# Patient Record
Sex: FEMALE | Race: WHITE | NOT HISPANIC OR LATINO | Employment: OTHER | URBAN - METROPOLITAN AREA
[De-identification: names, ages, dates, MRNs, and addresses within clinical notes are randomized per-mention and may not be internally consistent; named-entity substitution may affect disease eponyms.]

---

## 2020-03-03 ENCOUNTER — HOSPITAL ENCOUNTER (OUTPATIENT)
Dept: CARDIOLOGY | Facility: HOSPITAL | Age: 65
Discharge: HOME | End: 2020-03-03

## 2020-03-03 ENCOUNTER — HOSPITAL ENCOUNTER (INPATIENT)
Facility: HOSPITAL | Age: 65
LOS: 2 days | Discharge: HOME | End: 2020-03-05
Attending: INTERNAL MEDICINE | Admitting: INTERNAL MEDICINE

## 2020-03-03 VITALS
HEART RATE: 93 BPM | SYSTOLIC BLOOD PRESSURE: 188 MMHG | HEIGHT: 63 IN | DIASTOLIC BLOOD PRESSURE: 99 MMHG | WEIGHT: 134 LBS | OXYGEN SATURATION: 97 % | BODY MASS INDEX: 23.74 KG/M2

## 2020-03-03 DIAGNOSIS — R07.9 CHEST PAIN, UNSPECIFIED TYPE: ICD-10-CM

## 2020-03-03 DIAGNOSIS — R06.02 SHORTNESS OF BREATH: ICD-10-CM

## 2020-03-03 DIAGNOSIS — I10 HYPERTENSION, UNSPECIFIED TYPE: Primary | ICD-10-CM

## 2020-03-03 PROBLEM — I16.0 HYPERTENSIVE URGENCY: Status: ACTIVE | Noted: 2020-03-03

## 2020-03-03 LAB
ALBUMIN SERPL-MCNC: 5.1 G/DL (ref 3.5–5.2)
ALBUMIN/GLOB SERPL: 2.4 G/DL
ALP SERPL-CCNC: 67 U/L (ref 39–117)
ALT SERPL W P-5'-P-CCNC: 12 U/L (ref 1–33)
ANION GAP SERPL CALCULATED.3IONS-SCNC: 14.3 MMOL/L (ref 5–15)
AST SERPL-CCNC: 18 U/L (ref 1–32)
BASOPHILS # BLD AUTO: 0.03 10*3/MM3 (ref 0–0.2)
BASOPHILS NFR BLD AUTO: 0.5 % (ref 0–1.5)
BILIRUB SERPL-MCNC: 0.4 MG/DL (ref 0.2–1.2)
BUN BLD-MCNC: 14 MG/DL (ref 8–23)
BUN/CREAT SERPL: 18.7 (ref 7–25)
CALCIUM SPEC-SCNC: 9.7 MG/DL (ref 8.6–10.5)
CHLORIDE SERPL-SCNC: 98 MMOL/L (ref 98–107)
CO2 SERPL-SCNC: 24.7 MMOL/L (ref 22–29)
CREAT BLD-MCNC: 0.75 MG/DL (ref 0.57–1)
D DIMER PPP FEU-MCNC: 0.39 MCGFEU/ML (ref 0–0.49)
DEPRECATED RDW RBC AUTO: 42.4 FL (ref 37–54)
EOSINOPHIL # BLD AUTO: 0.07 10*3/MM3 (ref 0–0.4)
EOSINOPHIL NFR BLD AUTO: 1.3 % (ref 0.3–6.2)
ERYTHROCYTE [DISTWIDTH] IN BLOOD BY AUTOMATED COUNT: 12.6 % (ref 12.3–15.4)
GFR SERPL CREATININE-BSD FRML MDRD: 77 ML/MIN/1.73
GLOBULIN UR ELPH-MCNC: 2.1 GM/DL
GLUCOSE BLD-MCNC: 94 MG/DL (ref 65–99)
HCT VFR BLD AUTO: 40.7 % (ref 34–46.6)
HGB BLD-MCNC: 13.8 G/DL (ref 12–15.9)
IMM GRANULOCYTES # BLD AUTO: 0.01 10*3/MM3 (ref 0–0.05)
IMM GRANULOCYTES NFR BLD AUTO: 0.2 % (ref 0–0.5)
LYMPHOCYTES # BLD AUTO: 1.02 10*3/MM3 (ref 0.7–3.1)
LYMPHOCYTES NFR BLD AUTO: 18.6 % (ref 19.6–45.3)
MCH RBC QN AUTO: 31.1 PG (ref 26.6–33)
MCHC RBC AUTO-ENTMCNC: 33.9 G/DL (ref 31.5–35.7)
MCV RBC AUTO: 91.7 FL (ref 79–97)
MONOCYTES # BLD AUTO: 0.6 10*3/MM3 (ref 0.1–0.9)
MONOCYTES NFR BLD AUTO: 10.9 % (ref 5–12)
NEUTROPHILS # BLD AUTO: 3.75 10*3/MM3 (ref 1.7–7)
NEUTROPHILS NFR BLD AUTO: 68.5 % (ref 42.7–76)
NRBC BLD AUTO-RTO: 0 /100 WBC (ref 0–0.2)
NT-PROBNP SERPL-MCNC: 96.6 PG/ML (ref 5–900)
PLATELET # BLD AUTO: 241 10*3/MM3 (ref 140–450)
PMV BLD AUTO: 10.6 FL (ref 6–12)
POTASSIUM BLD-SCNC: 3.8 MMOL/L (ref 3.5–5.2)
PROT SERPL-MCNC: 7.2 G/DL (ref 6–8.5)
RBC # BLD AUTO: 4.44 10*6/MM3 (ref 3.77–5.28)
SODIUM BLD-SCNC: 137 MMOL/L (ref 136–145)
TROPONIN T SERPL-MCNC: <0.01 NG/ML (ref 0–0.03)
TROPONIN T SERPL-MCNC: <0.01 NG/ML (ref 0–0.03)
TSH SERPL DL<=0.05 MIU/L-ACNC: 3.76 UIU/ML (ref 0.27–4.2)
WBC NRBC COR # BLD: 5.48 10*3/MM3 (ref 3.4–10.8)

## 2020-03-03 PROCEDURE — 84443 ASSAY THYROID STIM HORMONE: CPT | Performed by: INTERNAL MEDICINE

## 2020-03-03 PROCEDURE — 80053 COMPREHEN METABOLIC PANEL: CPT | Performed by: INTERNAL MEDICINE

## 2020-03-03 PROCEDURE — 36415 COLL VENOUS BLD VENIPUNCTURE: CPT

## 2020-03-03 PROCEDURE — 94760 N-INVAS EAR/PLS OXIMETRY 1: CPT

## 2020-03-03 PROCEDURE — 84484 ASSAY OF TROPONIN QUANT: CPT | Performed by: INTERNAL MEDICINE

## 2020-03-03 PROCEDURE — 84244 ASSAY OF RENIN: CPT | Performed by: INTERNAL MEDICINE

## 2020-03-03 PROCEDURE — 25010000002 ONDANSETRON PER 1 MG: Performed by: INTERNAL MEDICINE

## 2020-03-03 PROCEDURE — 85379 FIBRIN DEGRADATION QUANT: CPT | Performed by: INTERNAL MEDICINE

## 2020-03-03 PROCEDURE — 85025 COMPLETE CBC W/AUTO DIFF WBC: CPT | Performed by: INTERNAL MEDICINE

## 2020-03-03 PROCEDURE — G0378 HOSPITAL OBSERVATION PER HR: HCPCS

## 2020-03-03 PROCEDURE — 83880 ASSAY OF NATRIURETIC PEPTIDE: CPT | Performed by: INTERNAL MEDICINE

## 2020-03-03 PROCEDURE — 99222 1ST HOSP IP/OBS MODERATE 55: CPT | Performed by: INTERNAL MEDICINE

## 2020-03-03 PROCEDURE — 82088 ASSAY OF ALDOSTERONE: CPT | Performed by: INTERNAL MEDICINE

## 2020-03-03 RX ORDER — ASPIRIN 81 MG/1
81 TABLET ORAL DAILY
Status: DISCONTINUED | OUTPATIENT
Start: 2020-03-03 | End: 2020-03-05 | Stop reason: HOSPADM

## 2020-03-03 RX ORDER — SODIUM CHLORIDE 0.9 % (FLUSH) 0.9 %
10 SYRINGE (ML) INJECTION AS NEEDED
Status: DISCONTINUED | OUTPATIENT
Start: 2020-03-03 | End: 2020-03-05 | Stop reason: HOSPADM

## 2020-03-03 RX ORDER — HYDROMORPHONE HYDROCHLORIDE 1 MG/ML
0.5 INJECTION, SOLUTION INTRAMUSCULAR; INTRAVENOUS; SUBCUTANEOUS
Status: DISCONTINUED | OUTPATIENT
Start: 2020-03-03 | End: 2020-03-05

## 2020-03-03 RX ORDER — NITROGLYCERIN 0.4 MG/1
0.4 TABLET SUBLINGUAL
Status: DISCONTINUED | OUTPATIENT
Start: 2020-03-03 | End: 2020-03-05 | Stop reason: HOSPADM

## 2020-03-03 RX ORDER — SODIUM CHLORIDE 0.9 % (FLUSH) 0.9 %
10 SYRINGE (ML) INJECTION AS NEEDED
Status: DISCONTINUED | OUTPATIENT
Start: 2020-03-03 | End: 2020-03-05

## 2020-03-03 RX ORDER — SODIUM CHLORIDE 0.9 % (FLUSH) 0.9 %
10 SYRINGE (ML) INJECTION EVERY 12 HOURS SCHEDULED
Status: DISCONTINUED | OUTPATIENT
Start: 2020-03-03 | End: 2020-03-05 | Stop reason: HOSPADM

## 2020-03-03 RX ORDER — LORAZEPAM 2 MG/ML
1 INJECTION INTRAMUSCULAR EVERY 4 HOURS PRN
Status: DISCONTINUED | OUTPATIENT
Start: 2020-03-03 | End: 2020-03-04

## 2020-03-03 RX ORDER — CARVEDILOL 3.12 MG/1
3.12 TABLET ORAL 2 TIMES DAILY WITH MEALS
Status: DISCONTINUED | OUTPATIENT
Start: 2020-03-03 | End: 2020-03-05 | Stop reason: HOSPADM

## 2020-03-03 RX ORDER — AMLODIPINE BESYLATE 5 MG/1
5 TABLET ORAL
Status: DISCONTINUED | OUTPATIENT
Start: 2020-03-03 | End: 2020-03-05 | Stop reason: HOSPADM

## 2020-03-03 RX ORDER — ONDANSETRON 2 MG/ML
4 INJECTION INTRAMUSCULAR; INTRAVENOUS EVERY 6 HOURS PRN
Status: DISCONTINUED | OUTPATIENT
Start: 2020-03-03 | End: 2020-03-05

## 2020-03-03 RX ADMIN — AMLODIPINE BESYLATE 5 MG: 5 TABLET ORAL at 18:52

## 2020-03-03 RX ADMIN — CARVEDILOL 3.12 MG: 3.12 TABLET, FILM COATED ORAL at 18:19

## 2020-03-03 RX ADMIN — NICARDIPINE HYDROCHLORIDE 5 MG/HR: 2.5 INJECTION INTRAVENOUS at 18:20

## 2020-03-03 RX ADMIN — ASPIRIN 81 MG: 81 TABLET, COATED ORAL at 18:19

## 2020-03-03 RX ADMIN — ONDANSETRON 4 MG: 2 INJECTION INTRAMUSCULAR; INTRAVENOUS at 18:35

## 2020-03-04 ENCOUNTER — APPOINTMENT (OUTPATIENT)
Dept: CARDIOLOGY | Facility: HOSPITAL | Age: 65
End: 2020-03-04

## 2020-03-04 LAB
ANION GAP SERPL CALCULATED.3IONS-SCNC: 16.6 MMOL/L (ref 5–15)
AORTIC DIMENSIONLESS INDEX: 0.7 (DI)
BH CV ECHO MEAS - ACS: 1.7 CM
BH CV ECHO MEAS - AO ARCH DIAM (PROXIMAL TRANS.): 2.4 CM
BH CV ECHO MEAS - AO MAX PG (FULL): 4 MMHG
BH CV ECHO MEAS - AO MAX PG: 8.9 MMHG
BH CV ECHO MEAS - AO MEAN PG (FULL): 2 MMHG
BH CV ECHO MEAS - AO MEAN PG: 4 MMHG
BH CV ECHO MEAS - AO ROOT AREA (BSA CORRECTED): 1.9
BH CV ECHO MEAS - AO ROOT AREA: 7.5 CM^2
BH CV ECHO MEAS - AO ROOT DIAM: 3.1 CM
BH CV ECHO MEAS - AO V2 MAX: 149 CM/SEC
BH CV ECHO MEAS - AO V2 MEAN: 95.3 CM/SEC
BH CV ECHO MEAS - AO V2 VTI: 27.9 CM
BH CV ECHO MEAS - ASC AORTA: 3.2 CM
BH CV ECHO MEAS - AVA(I,A): 1.7 CM^2
BH CV ECHO MEAS - AVA(I,D): 1.7 CM^2
BH CV ECHO MEAS - AVA(V,A): 1.7 CM^2
BH CV ECHO MEAS - AVA(V,D): 1.7 CM^2
BH CV ECHO MEAS - BSA(HAYCOCK): 1.6 M^2
BH CV ECHO MEAS - BSA: 1.6 M^2
BH CV ECHO MEAS - BZI_BMI: 24.5 KILOGRAMS/M^2
BH CV ECHO MEAS - BZI_METRIC_HEIGHT: 157.5 CM
BH CV ECHO MEAS - BZI_METRIC_WEIGHT: 60.8 KG
BH CV ECHO MEAS - DESC AORTA: 1.2 CM
BH CV ECHO MEAS - DIST REN A EDV LEFT: 25.3 CM/S
BH CV ECHO MEAS - DIST REN A PSV LEFT: 69.3 CM/S
BH CV ECHO MEAS - EDV(CUBED): 64 ML
BH CV ECHO MEAS - EDV(MOD-SP2): 61 ML
BH CV ECHO MEAS - EDV(MOD-SP4): 78 ML
BH CV ECHO MEAS - EDV(TEICH): 70 ML
BH CV ECHO MEAS - EF(CUBED): 65.7 %
BH CV ECHO MEAS - EF(MOD-BP): 61 %
BH CV ECHO MEAS - EF(MOD-SP2): 60.7 %
BH CV ECHO MEAS - EF(MOD-SP4): 61.5 %
BH CV ECHO MEAS - EF(TEICH): 57.8 %
BH CV ECHO MEAS - ESV(CUBED): 22 ML
BH CV ECHO MEAS - ESV(MOD-SP2): 24 ML
BH CV ECHO MEAS - ESV(MOD-SP4): 30 ML
BH CV ECHO MEAS - ESV(TEICH): 29.6 ML
BH CV ECHO MEAS - FS: 30 %
BH CV ECHO MEAS - IVS/LVPW: 1.3
BH CV ECHO MEAS - IVSD: 1 CM
BH CV ECHO MEAS - LAT PEAK E' VEL: 8 CM/SEC
BH CV ECHO MEAS - LV DIASTOLIC VOL/BSA (35-75): 48.4 ML/M^2
BH CV ECHO MEAS - LV MASS(C)D: 109.7 GRAMS
BH CV ECHO MEAS - LV MASS(C)DI: 68 GRAMS/M^2
BH CV ECHO MEAS - LV MAX PG: 4.9 MMHG
BH CV ECHO MEAS - LV MEAN PG: 2 MMHG
BH CV ECHO MEAS - LV SYSTOLIC VOL/BSA (12-30): 18.6 ML/M^2
BH CV ECHO MEAS - LV V1 MAX: 111 CM/SEC
BH CV ECHO MEAS - LV V1 MEAN: 66 CM/SEC
BH CV ECHO MEAS - LV V1 VTI: 20.5 CM
BH CV ECHO MEAS - LVIDD: 4 CM
BH CV ECHO MEAS - LVIDS: 2.8 CM
BH CV ECHO MEAS - LVLD AP2: 7.6 CM
BH CV ECHO MEAS - LVLD AP4: 7.7 CM
BH CV ECHO MEAS - LVLS AP2: 6.2 CM
BH CV ECHO MEAS - LVLS AP4: 6.4 CM
BH CV ECHO MEAS - LVOT AREA (M): 2.3 CM^2
BH CV ECHO MEAS - LVOT AREA: 2.3 CM^2
BH CV ECHO MEAS - LVOT DIAM: 1.7 CM
BH CV ECHO MEAS - LVPWD: 0.8 CM
BH CV ECHO MEAS - MED PEAK E' VEL: 6 CM/SEC
BH CV ECHO MEAS - MID REN A EDV LEFT: 25.1 CM/S
BH CV ECHO MEAS - MID REN A PSV LEFT: 73.3 CM/S
BH CV ECHO MEAS - MV A DUR: 0.14 SEC
BH CV ECHO MEAS - MV A MAX VEL: 81.3 CM/SEC
BH CV ECHO MEAS - MV DEC SLOPE: 521 CM/SEC^2
BH CV ECHO MEAS - MV DEC TIME: 210 SEC
BH CV ECHO MEAS - MV E MAX VEL: 61.8 CM/SEC
BH CV ECHO MEAS - MV E/A: 0.76
BH CV ECHO MEAS - MV MAX PG: 2.9 MMHG
BH CV ECHO MEAS - MV MEAN PG: 1 MMHG
BH CV ECHO MEAS - MV P1/2T MAX VEL: 72.2 CM/SEC
BH CV ECHO MEAS - MV P1/2T: 40.6 MSEC
BH CV ECHO MEAS - MV V2 MAX: 84.6 CM/SEC
BH CV ECHO MEAS - MV V2 MEAN: 50.9 CM/SEC
BH CV ECHO MEAS - MV V2 VTI: 17.3 CM
BH CV ECHO MEAS - MVA P1/2T LCG: 3 CM^2
BH CV ECHO MEAS - MVA(P1/2T): 5.4 CM^2
BH CV ECHO MEAS - MVA(VTI): 2.7 CM^2
BH CV ECHO MEAS - PA ACC TIME: 0.11 SEC
BH CV ECHO MEAS - PA MAX PG (FULL): 1.4 MMHG
BH CV ECHO MEAS - PA MAX PG: 3.2 MMHG
BH CV ECHO MEAS - PA PR(ACCEL): 31.3 MMHG
BH CV ECHO MEAS - PA V2 MAX: 89.6 CM/SEC
BH CV ECHO MEAS - PROX REN A EDV LEFT: 47.8 CM/S
BH CV ECHO MEAS - PROX REN A PSV LEFT: 138 CM/S
BH CV ECHO MEAS - PULM A REVS DUR: 0.14 SEC
BH CV ECHO MEAS - PULM A REVS VEL: 42.6 CM/SEC
BH CV ECHO MEAS - PULM DIAS VEL: 46 CM/SEC
BH CV ECHO MEAS - PULM S/D: 1.3
BH CV ECHO MEAS - PULM SYS VEL: 58.7 CM/SEC
BH CV ECHO MEAS - PVA(V,A): 1.9 CM^2
BH CV ECHO MEAS - PVA(V,D): 1.9 CM^2
BH CV ECHO MEAS - QP/QS: 0.53
BH CV ECHO MEAS - RAP SYSTOLE: 3 MMHG
BH CV ECHO MEAS - RV MAX PG: 1.8 MMHG
BH CV ECHO MEAS - RV MEAN PG: 1 MMHG
BH CV ECHO MEAS - RV V1 MAX: 66.5 CM/SEC
BH CV ECHO MEAS - RV V1 MEAN: 41.3 CM/SEC
BH CV ECHO MEAS - RV V1 VTI: 9.7 CM
BH CV ECHO MEAS - RVOT AREA: 2.5 CM^2
BH CV ECHO MEAS - RVOT DIAM: 1.8 CM
BH CV ECHO MEAS - SI(AO): 130.6 ML/M^2
BH CV ECHO MEAS - SI(CUBED): 26.1 ML/M^2
BH CV ECHO MEAS - SI(LVOT): 28.9 ML/M^2
BH CV ECHO MEAS - SI(MOD-SP2): 22.9 ML/M^2
BH CV ECHO MEAS - SI(MOD-SP4): 29.8 ML/M^2
BH CV ECHO MEAS - SI(TEICH): 25.1 ML/M^2
BH CV ECHO MEAS - SV(AO): 210.6 ML
BH CV ECHO MEAS - SV(CUBED): 42 ML
BH CV ECHO MEAS - SV(LVOT): 46.5 ML
BH CV ECHO MEAS - SV(MOD-SP2): 37 ML
BH CV ECHO MEAS - SV(MOD-SP4): 48 ML
BH CV ECHO MEAS - SV(RVOT): 24.6 ML
BH CV ECHO MEAS - SV(TEICH): 40.4 ML
BH CV ECHO MEAS - TAPSE (>1.6): 1.8 CM
BH CV ECHO MEASUREMENTS AVERAGE E/E' RATIO: 8.83
BH CV VAS BP LEFT ARM: NORMAL MMHG
BH CV VAS BP RIGHT ARM: NORMAL MMHG
BH CV VAS BP RIGHT ARM: NORMAL MMHG
BH CV VAS RENAL AORTIC MID PSV: 60.5 CM/S
BH CV VAS RENAL HILUM LEFT EDV: 18.2 CM/S
BH CV VAS RENAL HILUM LEFT PSV: 46.2 CM/S
BH CV VAS RENAL HILUM RIGHT EDV: 11 CM/S
BH CV VAS RENAL HILUM RIGHT PSV: 31.9 CM/S
BH CV XLRA - RV BASE: 2.5 CM
BH CV XLRA - RV LENGTH: 7.5 CM
BH CV XLRA - RV MID: 1.7 CM
BH CV XLRA - TDI S': 13 CM/SEC
BH CV XLRA MEAS - KID L LEFT: 10.7 CM
BH CV XLRA MEAS DIST REN A EDV RIGHT: 37.2 CM/S
BH CV XLRA MEAS DIST REN A PSV RIGHT: 93.4 CM/S
BH CV XLRA MEAS KID L RIGHT: 11.4 CM
BH CV XLRA MEAS KID W RIGHT: 5 CM
BH CV XLRA MEAS MID REN A EDV RIGHT: 51.5 CM/S
BH CV XLRA MEAS MID REN A PSV RIGHT: 175 CM/S
BH CV XLRA MEAS PROX REN A EDV RIGHT: 56.1 CM/S
BH CV XLRA MEAS PROX REN A PSV RIGHT: 149 CM/S
BH CV XLRA MEAS RAR LEFT: 2.3
BH CV XLRA MEAS RAR RIGHT: 2.9
BH CV XLRA MEAS RENAL A ORG EDV LEFT: 28 CM/S
BH CV XLRA MEAS RENAL A ORG EDV RIGHT: 20.1 CM/S
BH CV XLRA MEAS RENAL A ORG PSV LEFT: 73 CM/S
BH CV XLRA MEAS RENAL A ORG PSV RIGHT: 66.8 CM/S
BUN BLD-MCNC: 8 MG/DL (ref 8–23)
BUN/CREAT SERPL: 13.6 (ref 7–25)
CALCIUM SPEC-SCNC: 9.2 MG/DL (ref 8.6–10.5)
CHLORIDE SERPL-SCNC: 97 MMOL/L (ref 98–107)
CHOLEST SERPL-MCNC: 230 MG/DL (ref 0–200)
CO2 SERPL-SCNC: 24.4 MMOL/L (ref 22–29)
CREAT BLD-MCNC: 0.59 MG/DL (ref 0.57–1)
GFR SERPL CREATININE-BSD FRML MDRD: 102 ML/MIN/1.73
GLUCOSE BLD-MCNC: 106 MG/DL (ref 65–99)
HDLC SERPL-MCNC: 54 MG/DL (ref 40–60)
LDLC SERPL CALC-MCNC: 137 MG/DL (ref 0–100)
LDLC/HDLC SERPL: 2.54 {RATIO}
LEFT ATRIUM VOLUME INDEX: 16 ML/M2
LEFT ATRIUM VOLUME: 22 CM3
LEFT KIDNEY WIDTH: 5.2 CM
LEFT RENAL UPPER PARENCHYMA MAX: 31.9 CM/S
LEFT RENAL UPPER PARENCHYMA MIN: 11 CM/S
LEFT RENAL UPPER PARENCHYMA RI: 0.66
MAXIMAL PREDICTED HEART RATE: 153 BPM
POTASSIUM BLD-SCNC: 3.5 MMOL/L (ref 3.5–5.2)
RIGHT ACCESSORY RENAL DIST DIAS: 11.6 CM/S
RIGHT ACCESSORY RENAL DIST SYS: 54.5 CM/S
RIGHT ACCESSORY RENAL MID DIAS: 15.4 CM/S
RIGHT ACCESSORY RENAL MID SYS: 58.3 CM/S
RIGHT ACCESSORY RENAL PROX DIAS: 32.5 CM/S
RIGHT ACCESSORY RENAL PROX SYS: 115 CM/S
RIGHT RENAL UPPER PARENCHYMA MAX: 32.5 CM/S
RIGHT RENAL UPPER PARENCHYMA MIN: 12.1 CM/S
RIGHT RENAL UPPER PARENCHYMA RI: 0.63
SODIUM BLD-SCNC: 138 MMOL/L (ref 136–145)
STRESS TARGET HR: 130 BPM
TRIGL SERPL-MCNC: 195 MG/DL (ref 0–150)
TROPONIN T SERPL-MCNC: <0.01 NG/ML (ref 0–0.03)
VLDLC SERPL-MCNC: 39 MG/DL (ref 5–40)

## 2020-03-04 PROCEDURE — 81050 URINALYSIS VOLUME MEASURE: CPT | Performed by: INTERNAL MEDICINE

## 2020-03-04 PROCEDURE — 93010 ELECTROCARDIOGRAM REPORT: CPT | Performed by: INTERNAL MEDICINE

## 2020-03-04 PROCEDURE — 82384 ASSAY THREE CATECHOLAMINES: CPT | Performed by: INTERNAL MEDICINE

## 2020-03-04 PROCEDURE — 93975 VASCULAR STUDY: CPT

## 2020-03-04 PROCEDURE — 99232 SBSQ HOSP IP/OBS MODERATE 35: CPT | Performed by: INTERNAL MEDICINE

## 2020-03-04 PROCEDURE — 93306 TTE W/DOPPLER COMPLETE: CPT | Performed by: INTERNAL MEDICINE

## 2020-03-04 PROCEDURE — 84585 ASSAY OF URINE VMA: CPT | Performed by: INTERNAL MEDICINE

## 2020-03-04 PROCEDURE — 93005 ELECTROCARDIOGRAM TRACING: CPT | Performed by: INTERNAL MEDICINE

## 2020-03-04 PROCEDURE — 84484 ASSAY OF TROPONIN QUANT: CPT | Performed by: INTERNAL MEDICINE

## 2020-03-04 PROCEDURE — 83497 ASSAY OF 5-HIAA: CPT | Performed by: INTERNAL MEDICINE

## 2020-03-04 PROCEDURE — G0378 HOSPITAL OBSERVATION PER HR: HCPCS

## 2020-03-04 PROCEDURE — 93306 TTE W/DOPPLER COMPLETE: CPT

## 2020-03-04 PROCEDURE — 80061 LIPID PANEL: CPT | Performed by: INTERNAL MEDICINE

## 2020-03-04 PROCEDURE — 80048 BASIC METABOLIC PNL TOTAL CA: CPT | Performed by: INTERNAL MEDICINE

## 2020-03-04 RX ORDER — LORAZEPAM 1 MG/1
1 TABLET ORAL EVERY 4 HOURS PRN
Status: DISCONTINUED | OUTPATIENT
Start: 2020-03-04 | End: 2020-03-05

## 2020-03-04 RX ADMIN — CARVEDILOL 3.12 MG: 3.12 TABLET, FILM COATED ORAL at 11:36

## 2020-03-04 RX ADMIN — NICARDIPINE HYDROCHLORIDE 2.5 MG/HR: 2.5 INJECTION INTRAVENOUS at 11:45

## 2020-03-04 RX ADMIN — AMLODIPINE BESYLATE 5 MG: 5 TABLET ORAL at 11:36

## 2020-03-04 RX ADMIN — ASPIRIN 81 MG: 81 TABLET, COATED ORAL at 11:36

## 2020-03-04 RX ADMIN — SODIUM CHLORIDE, PRESERVATIVE FREE 10 ML: 5 INJECTION INTRAVENOUS at 20:51

## 2020-03-04 RX ADMIN — CARVEDILOL 3.12 MG: 3.12 TABLET, FILM COATED ORAL at 17:17

## 2020-03-04 NOTE — PROGRESS NOTES
"Pharmacy Consult - review allergy to \"preservatives\"     Alicia Lan has a consult for pharmacy to review allergies for information on \"preservatives\" per Dr. Sawant's request.      Allergies listed for methylchloroisothiazolinone [methylisothiazolinone] (swelling) and [id number] alkyl acrulate crosspol (hives).      Assessment  Allergies currently listed are used in cosmetics and personal care items. Per prior pharmacist review, patient states she can take aspirin and had reaction of dyspepsia to statins in the past (2014). Unknown strength and .      Patient is currently using topical estradiol-norethindrone Combipatch (NDC [address]-3). Adhesive layer contains acrylic adhesive, silicone adhesive, oleic acid NF, povidone USP and dipropylene glycol, in addition to estradiol and KARRIE.     Inactive ingredients in current oral/IV medications for future reference:  - Aspirin 81mg EC (NDC 1385-3375-32; [employer]): corn starch, D&C yellow #10, FD&C yellow #6, hypromellose, methacrylic acid, microcrystalline cellulose, polydextrose, polyethylene glycol, shellac wax, silica, simethicone, sodium bicarbonate, sodium lauryl sulfate, talc, titanium dioxide, triacetin, triethyl citrate.  - Carvedilol 3.125 mg tab (NDC [address]-68; [employer]): Hypromellose, lactose monohydrate,  magnesium stearate, polyethylene glycol, silicon dioxide, povidone K30, Talc, titanium dioxide, crospovidone (12 MPA.S AT 5%)   - Amlodipine 5 mg tabs (NDC 85061-676-39; [employer]): microcrystalline cellulose, dibasic calcium phosphate anhydrous, sodium starch glycolate, colloidal silicon dioxide, magnesium stearate.  - Hydromorphone 0.5 mg/0.5 mL inj (NDC 5598-1680-74; [employer]): sodium lactate, sodium chloride, lactic acid, sodium hydroxide   - Ondansetron 4 mg/2 mL (NDC 90326-904-60; [employer]): Sodium chloride, citric acid monohydrate, sodium citrate dehydrate  - Nicardipine 25 " mg/10 mL (NDC 0143-9542-10; [employer]): Sodium chloride, sodium hydroxide, 2,4-dihydroxybenzoic acid  - Lorazepam 2 mg/ 2 mL (NDC 1972-4986-35; [employer]): Polyethylene glycol 400, propylene glycol, benzyl alcohol      Jayden RichardsD, ROBERT, BCPS  03/04/20 1:34 PM

## 2020-03-04 NOTE — PROGRESS NOTES
"Date of Office Visit: 2020  Encounter Provider: Rico Sawant MD  Place of Service: [facility] CARDIOLOGY  Patient Name: Alicia Lan  :1955    Chief complaint: Chest pain, shortness of breath, severe hypertension.    History of Present Illness:    I had the pleasure of seeing the patient in our CEC unit on 3/3/2020.  She is a very pleasant 65 year-old white female who is been relatively healthy who presents for evaluation.  The patient states that within the last 4 to 6 weeks, she has noted that she has had intermittent chest tightness.  This is across her precordium, and has occurred both with and without exertion.  It typically occurs at random times, but can last for several hours.  She has also been more short of breath with exertion.  Despite this, she actually works out on a daily basis.  Within the last week, she has noticed that her blood pressure has been significantly higher than normal.  In fact, she states that her blood pressure normally runs low.  Last night, her blood pressure reached a high of 224/118 at home.  She states that she does feel some pressure in her chest when this occurs, and she also feels \"dizzy\".  She is visibly flushed in the room today.  Her blood pressure on the right upon presentation to the CEC unit was 188/99, and 200/99 on the left.  After several minutes of sitting, her blood pressure was rechecked, and was still 168/118.  She did have an EKG performed prior to getting here today which showed a left anterior fascicular block, as well as T wave changes in the septal leads (V1 and V2).  She does not have a personal history of cardiovascular disease, although her family history is fairly significant.  Her sister had an MI at approximately age 70 with 4 subsequent stents.  Her father had a CABG and valve replacement surgery.  Her mother  suddenly at age 74, possibly from an MI.    She currently is only taking a CombiPatch " "hormone replacement therapy.  She tells me that she cannot take statin medications as they caused her to feel \"terrible\" in the past.  She also tells me that she cannot take many oral medications because she is allergic to \"preservatives\".    Past Medical History:   Diagnosis Date   • Hyperlipidemia        History reviewed. No pertinent surgical history.    No current facility-administered medications on file prior to encounter.      Current Outpatient Medications on File Prior to Encounter   Medication Sig Dispense Refill   • estradiol-norethindrone (COMBIPATCH) 0.05-0.14 MG/DAY patch Place 1 patch on the skin as directed by provider 2 (Two) Times a Week.       Allergies as of 03/03/2020 - Reviewed 03/03/2020   Allergen Reaction Noted   • Methylchloroisothiazolinone [methylisothiazolinone] Swelling 03/03/2020   • [id number] alkyl acrulate crosspol Hives 03/03/2020     Social History     Socioeconomic History   • Marital status:      Spouse name: Not on file   • Number of children: Not on file   • Years of education: Not on file   • Highest education level: Not on file   Tobacco Use   • Smoking status: Former Smoker     Family History   Problem Relation Age of Onset   • Heart attack Mother    • Hypertension Mother    • Hyperlipidemia Mother    • Heart attack Father    • Hypertension Father    • Hyperlipidemia Father    • Heart disease Father    • Heart attack Sister        Review of Systems   Cardiovascular: Positive for chest pain.   Respiratory: Positive for shortness of breath and snoring.    Skin: Positive for flushing.   Genitourinary: Positive for frequency.   Neurological: Positive for dizziness.   All other systems reviewed and are negative.     Objective:     Vitals:    03/03/20 1516   BP: (!) 188/99   BP Location: Right arm   Patient Position: Sitting   Pulse: 93   SpO2: 97%   Weight: 60.8 kg (134 lb)   Height: 160 cm (63\")     Body mass index is 23.74 kg/m².    Physical Exam   Constitutional: " She is oriented to person, place, and time. She appears well-developed and well-nourished.   HENT:   Head: Normocephalic and atraumatic.   Face is visibly flushed   Eyes: Conjunctivae are normal.   Neck: Neck supple.   Cardiovascular: Normal rate and regular rhythm. Exam reveals no gallop and no friction rub.   Murmur heard.   Systolic murmur is present with a grade of 2/6 at the upper left sternal border and lower left sternal border.  Pulmonary/Chest: Effort normal and breath sounds normal.   Abdominal: Soft. There is no tenderness.   Musculoskeletal: She exhibits no edema.   Neurological: She is alert and oriented to person, place, and time.   Skin: Skin is warm.   Psychiatric: She has a normal mood and affect. Her behavior is normal.     Lab Review:   Procedures      Assessment:       Diagnosis Plan   1. Hypertension, unspecified type     2. Chest pain, unspecified type  Cardiac Monitoring    Vital Signs - Once    Vital Signs - As Needed    Pulse Oximetry    Oxygen Therapy- Nasal Cannula; Titrate for SPO2: 92%, equal to or greater than    Insert Peripheral IV    sodium chloride 0.9 % flush 10 mL    nitroglycerin (NITROSTAT) SL tablet 0.4 mg    NPO Diet    Bathroom Privileges With Assistance    CBC & Differential    Comprehensive Metabolic Panel    Troponin T    D-Dimer    proBNP    Cardiac Monitoring    Vital Signs - Once    Insert Peripheral IV    NPO Diet    Bathroom Privileges With Assistance    Troponin T    Troponin T    D-Dimer    D-Dimer    proBNP    proBNP    CBC Auto Differential   3. Shortness of breath  Cardiac Monitoring    Vital Signs - Once    Vital Signs - As Needed    Pulse Oximetry    Oxygen Therapy- Nasal Cannula; Titrate for SPO2: 92%, equal to or greater than    Insert Peripheral IV    sodium chloride 0.9 % flush 10 mL    nitroglycerin (NITROSTAT) SL tablet 0.4 mg    NPO Diet    Bathroom Privileges With Assistance    CBC & Differential    Comprehensive Metabolic Panel    Troponin T    D-Dimer  "   proBNP    Cardiac Monitoring    Vital Signs - Once    Insert Peripheral IV    NPO Diet    Bathroom Privileges With Assistance    Troponin T    Troponin T    D-Dimer    D-Dimer    proBNP    proBNP    CBC Auto Differential     Plan:       At this point, I feel the patient is having a hypertensive urgency.  Her blood pressures have been markedly elevated, as detailed above.  Last night, she reached a peak of 224/118.  She is visibly flushed, and this very well could be contributing to her shortness of breath and chest tightness as well.  She also has an extensive family history of coronary artery disease as detailed above.    I recommended admitting her to the hospital for IV blood pressure medication control.  I would start her on a Cardene drip and slowly titrate downwards to start.  I also will ask pharmacy to comment on the \"preservative\" allergy.  If possible, I would like to start her on carvedilol and amlodipine for now.  I will also check a thyroid panel, renin/aldosterone ratio, 24-hour urine for catecholamines and 5-HIAA, and a renal arterial Doppler ultrasound (to assess for any potential renal artery stenosis).  She will be ruled out for an MI with serial troponin levels.  She did tell me that she can take aspirin, and I started her on 81 mg/day.  I will check a lipid panel in the morning as she states that she has had high cholesterol since she was in her 20s.  Again, she did very poorly on statins.  She did have a murmur on exam, and given this plus the chest pain and shortness of breath, I am going to obtain an echocardiogram.  Her EKG shows a left anterior fascicular block and T wave inversions in the septal leads.  She will need some assessment of her coronary arteries prior to discharge as well.  Of note, I did want to take her off of her CombiPatch, although she wanted to continue this to avoid withdrawals (she has been on this long-term).      "

## 2020-03-04 NOTE — PROGRESS NOTES
LOS: 0 days   Patient Care Team:  Klaudia Britt MD as PCP - General (Family Medicine)    Chief Complaint: Follow-up for hypertensive urgency, chest pain, hyperlipidemia.    Interval History: Doing much better today.  BP much better, and even off of Cardene at this point.  Flushing and chest pain have resolved.    Vital Signs:  Temp:  [97.6 °F (36.4 °C)-98.6 °F (37 °C)] 97.6 °F (36.4 °C)  Heart Rate:  [64-91] 89  Resp:  [16-18] 18  BP: (109-191)/() 128/80    Intake/Output Summary (Last 24 hours) at 3/4/2020 1528  Last data filed at 3/4/2020 0600  Gross per 24 hour   Intake 240 ml   Output 950 ml   Net -710 ml       Physical Exam:   General Appearance:    No acute distress, alert and oriented x4   Lungs:     Clear to auscultation bilaterally     Heart:    Regular rhythm and normal rate.  II/VI SM USB   Abdomen:     Soft, non-tender, non-distended.    Extremities:   Moves all extremities well.  No clubbing, cyanosis, or edema.     Results Review:    Results from last 7 days   Lab Units 03/04/20  0347   SODIUM mmol/L 138   POTASSIUM mmol/L 3.5   CHLORIDE mmol/L 97*   CO2 mmol/L 24.4   BUN mg/dL 8   CREATININE mg/dL 0.59   GLUCOSE mg/dL 106*   CALCIUM mg/dL 9.2     Results from last 7 days   Lab Units 03/04/20  0347 03/03/20  1846 03/03/20  1516   TROPONIN T ng/mL <0.010 <0.010 <0.010     Results from last 7 days   Lab Units 03/03/20  1516   WBC 10*3/mm3 5.48   HEMOGLOBIN g/dL 13.8   HEMATOCRIT % 40.7   PLATELETS 10*3/mm3 241         Results from last 7 days   Lab Units 03/04/20  0347   CHOLESTEROL mg/dL 230*         Results from last 7 days   Lab Units 03/04/20  0347   CHOLESTEROL mg/dL 230*   TRIGLYCERIDES mg/dL 195*   HDL CHOL mg/dL 54   LDL CHOL mg/dL 137*       I reviewed the patient's new clinical results.        Assessment:  1. Hypertensive urgency  2. Chest pain  3. Family history of coronary artery disease  4. Hyperlipidemia with statin intolerance  5. Left anterior fascicular block  6. Aortic  "sclerosis  7. Normal EF 60-65%  8. \"Preservative\" allergy    Plan:  -She is doing markedly better at this point.  Her blood pressure is down, and she is actually off of the Cardene drip.  She had a very good response to this.  I will continue her on the amlodipine and Coreg.  We will watch her blood pressure response carefully.      -She does have a \"preservative\" allergy, but is tolerating the amlodipine and Coreg thus far.  Appreciate pharmacy assistance.    -Echo with aortic sclerosis and mild AI - could not exclude a bicuspid AV.  This accounts for her murmur.      -Normal WM and normal EF on echo.    -Renal artery Doppler normal with no evidence of SENTHIL.    -TSH was normal.  Renin aldosterone ratio is pending.  Urine catecholamines and 5-HIAA are pending.    -If her blood pressure is down by the morning, will plan on an exercise Cardiolite stress test.    -She did not want to discontinue her hormone patch.  She is on aspirin 81 mg/day.    -She is statin intolerant.  I can discuss other options with her as an outpatient (such as Praluent or Repatha).    Rico Sawant MD  03/04/20  3:28 PM      "

## 2020-03-04 NOTE — ADDENDUM NOTE
Encounter addended by: Marjorie Crowley RN on: 3/4/2020 7:59 AM   Actions taken: Charge Capture section accepted

## 2020-03-04 NOTE — PLAN OF CARE
Problem: Patient Care Overview  Goal: Plan of Care Review  Outcome: Ongoing (interventions implemented as appropriate)  Flowsheets (Taken 3/4/2020 5013)  Progress: no change  Plan of Care Reviewed With: patient  Outcome Summary: Cardene titrated per MAR, gtt turned off early this morning per parameters. SBP has remained stable with cardene off.   24 hour clean catch urine started 3/3 2330.   NPO since midnight for renal artery doppler today; echo also planned for today.   VSS will continue to monitor closely.

## 2020-03-05 ENCOUNTER — APPOINTMENT (OUTPATIENT)
Dept: NUCLEAR MEDICINE | Facility: HOSPITAL | Age: 65
End: 2020-03-05

## 2020-03-05 VITALS
OXYGEN SATURATION: 96 % | HEART RATE: 70 BPM | DIASTOLIC BLOOD PRESSURE: 83 MMHG | TEMPERATURE: 97.5 F | BODY MASS INDEX: 24.51 KG/M2 | SYSTOLIC BLOOD PRESSURE: 133 MMHG | RESPIRATION RATE: 18 BRPM | WEIGHT: 133.2 LBS | HEIGHT: 62 IN

## 2020-03-05 LAB
ANION GAP SERPL CALCULATED.3IONS-SCNC: 14.7 MMOL/L (ref 5–15)
BH CV STRESS BP STAGE 1: NORMAL
BH CV STRESS BP STAGE 2: NORMAL
BH CV STRESS BP STAGE 3: NORMAL
BH CV STRESS BP STAGE 4: NORMAL
BH CV STRESS DURATION MIN STAGE 1: 3
BH CV STRESS DURATION MIN STAGE 2: 3
BH CV STRESS DURATION MIN STAGE 3: 3
BH CV STRESS DURATION MIN STAGE 4: 1
BH CV STRESS DURATION SEC STAGE 1: 0
BH CV STRESS DURATION SEC STAGE 2: 0
BH CV STRESS DURATION SEC STAGE 3: 0
BH CV STRESS DURATION SEC STAGE 4: 0
BH CV STRESS GRADE STAGE 1: 10
BH CV STRESS GRADE STAGE 2: 12
BH CV STRESS GRADE STAGE 3: 14
BH CV STRESS GRADE STAGE 4: 16
BH CV STRESS HR STAGE 1: 102
BH CV STRESS HR STAGE 2: 114
BH CV STRESS HR STAGE 3: 129
BH CV STRESS HR STAGE 4: 136
BH CV STRESS METS STAGE 1: 5
BH CV STRESS METS STAGE 2: 7.5
BH CV STRESS METS STAGE 3: 10
BH CV STRESS METS STAGE 4: 13.5
BH CV STRESS PROTOCOL 1: NORMAL
BH CV STRESS RECOVERY BP: NORMAL MMHG
BH CV STRESS RECOVERY HR: 92 BPM
BH CV STRESS SPEED STAGE 1: 1.7
BH CV STRESS SPEED STAGE 2: 2.5
BH CV STRESS SPEED STAGE 3: 3.4
BH CV STRESS SPEED STAGE 4: 4.2
BH CV STRESS STAGE 1: 1
BH CV STRESS STAGE 2: 2
BH CV STRESS STAGE 3: 3
BH CV STRESS STAGE 4: 4
BUN BLD-MCNC: 14 MG/DL (ref 8–23)
BUN/CREAT SERPL: 21.9 (ref 7–25)
CALCIUM SPEC-SCNC: 9.3 MG/DL (ref 8.6–10.5)
CHLORIDE SERPL-SCNC: 100 MMOL/L (ref 98–107)
CO2 SERPL-SCNC: 23.3 MMOL/L (ref 22–29)
CREAT BLD-MCNC: 0.64 MG/DL (ref 0.57–1)
GFR SERPL CREATININE-BSD FRML MDRD: 93 ML/MIN/1.73
GLUCOSE BLD-MCNC: 106 MG/DL (ref 65–99)
LV EF NUC BP: 74 %
MAXIMAL PREDICTED HEART RATE: 153 BPM
PERCENT MAX PREDICTED HR: 90.2 %
POTASSIUM BLD-SCNC: 4 MMOL/L (ref 3.5–5.2)
SODIUM BLD-SCNC: 138 MMOL/L (ref 136–145)
STRESS BASELINE BP: NORMAL MMHG
STRESS BASELINE HR: 72 BPM
STRESS PERCENT HR: 106 %
STRESS POST ESTIMATED WORKLOAD: 11.8 METS
STRESS POST EXERCISE DUR MIN: 10 MIN
STRESS POST EXERCISE DUR SEC: 0 SEC
STRESS POST PEAK BP: NORMAL MMHG
STRESS POST PEAK HR: 138 BPM
STRESS TARGET HR: 130 BPM

## 2020-03-05 PROCEDURE — 78452 HT MUSCLE IMAGE SPECT MULT: CPT | Performed by: INTERNAL MEDICINE

## 2020-03-05 PROCEDURE — 78452 HT MUSCLE IMAGE SPECT MULT: CPT

## 2020-03-05 PROCEDURE — 93018 CV STRESS TEST I&R ONLY: CPT | Performed by: INTERNAL MEDICINE

## 2020-03-05 PROCEDURE — A9500 TC99M SESTAMIBI: HCPCS | Performed by: INTERNAL MEDICINE

## 2020-03-05 PROCEDURE — 93017 CV STRESS TEST TRACING ONLY: CPT

## 2020-03-05 PROCEDURE — 80048 BASIC METABOLIC PNL TOTAL CA: CPT | Performed by: INTERNAL MEDICINE

## 2020-03-05 PROCEDURE — 99239 HOSP IP/OBS DSCHRG MGMT >30: CPT | Performed by: INTERNAL MEDICINE

## 2020-03-05 PROCEDURE — 0 TECHNETIUM SESTAMIBI: Performed by: INTERNAL MEDICINE

## 2020-03-05 PROCEDURE — 93016 CV STRESS TEST SUPVJ ONLY: CPT | Performed by: INTERNAL MEDICINE

## 2020-03-05 RX ORDER — CARVEDILOL 3.12 MG/1
3.12 TABLET ORAL 2 TIMES DAILY WITH MEALS
Qty: 30 TABLET | Refills: 6 | Status: SHIPPED | OUTPATIENT
Start: 2020-03-05 | End: 2020-03-06

## 2020-03-05 RX ORDER — AMLODIPINE BESYLATE 5 MG/1
5 TABLET ORAL
Qty: 30 TABLET | Refills: 6 | Status: SHIPPED | OUTPATIENT
Start: 2020-03-05 | End: 2020-03-06

## 2020-03-05 RX ADMIN — TECHNETIUM TC 99M SESTAMIBI 1 DOSE: 1 INJECTION INTRAVENOUS at 10:15

## 2020-03-05 RX ADMIN — AMLODIPINE BESYLATE 5 MG: 5 TABLET ORAL at 12:40

## 2020-03-05 RX ADMIN — TECHNETIUM TC 99M SESTAMIBI 1 DOSE: 1 INJECTION INTRAVENOUS at 07:30

## 2020-03-05 RX ADMIN — CARVEDILOL 3.12 MG: 3.12 TABLET, FILM COATED ORAL at 12:40

## 2020-03-05 RX ADMIN — ASPIRIN 81 MG: 81 TABLET, COATED ORAL at 12:40

## 2020-03-05 NOTE — DISCHARGE SUMMARY
"Date of Admission: 3/3/2020    Date of Discharge:  3/5/2020    Discharge Diagnoses:   1. Hypertensive urgency  2. Chest pain  3. Family history of coronary artery disease  4. Hyperlipidemia with statin intolerance  5. Left anterior fascicular block  6. Aortic sclerosis  7. Normal EF 60-65%  8. \"Preservative\" allergy      Procedures Performed:  1.  Echocardiogram on 3/4/2020  2.  Renal artery Doppler ultrasound on 3/4/2020  3.  Exercise Cardiolite stress test on 3/5/2020       Hospital Course:     This is a very pleasant 65 year-old white female who has been relatively healthy up until recently.  I saw her in our CEC unit on 3/3/2020.  For 4 to 6 weeks, she had been experiencing intermittent chest tightness across her precordium, both with and without exertion.  She also had been more short of breath on exertion.  She was grossly hypertensive in the CEC unit with a blood pressure of 200/99 on the left, and she was very flushed.  Her blood pressure had been as high as 224/118 the night before at home.  An EKG showed a left anterior fascicular block, as well as T wave changes in the the septal leads.  She had a significant family history of coronary artery disease, including her sister with an MI at age 70 with 4 subsequent stents, her father with a CABG and valve replacement surgery, and her mother dying suddenly at age 74 (possibly from an MI).    The patient was admitted and was placed on a Cardene drip for blood pressure control.  Amlodipine and carvedilol were added to her regimen.  She was concerned that she does have a preservative allergy, although she tolerated both medications fine.  She ruled out for an MI.  A renal artery Doppler ultrasound on 3/4/2020 showed normal renal arteries.  An echocardiogram obtained on 3/4/2020 showed an ejection fraction of 60 to 65%, mild LVH, grade 1 diastolic dysfunction, and moderately calcified aortic valve leaflets (aortic sclerosis).  She also had mild aortic " insufficiency.  Her blood pressure responded very well to the medications, and she was weaned off of Cardene fairly quickly.  At the time of discharge, her blood pressure was around 130/80, and she felt much better.  Her flushing and chest discomfort completely resolved after her blood pressure improved.  She did have an exercise Cardiolite stress test on 3/5/2020 which was completely normal, and she did extremely well on the treadmill.  It was felt that the cause for her symptoms was her uncontrolled hypertension.  Of note, I did recommend discontinuing her hormone replacement therapy, although she desired to continue on this.    She will follow-up with NATHAN Thomas, in 1 week.  She will follow-up with me in 4 weeks.      Discharge Medications:     Discharge Medications      New Medications      Instructions Start Date   amLODIPine 5 MG tablet  Commonly known as:  NORVASC   5 mg, Oral, Every 24 Hours Scheduled      carvedilol 3.125 MG tablet  Commonly known as:  COREG   3.125 mg, Oral, 2 Times Daily With Meals         Continue These Medications      Instructions Start Date   estradiol-norethindrone 0.05-0.14 MG/DAY patch  Commonly known as:  COMBIPATCH   1 patch, Transdermal, 2 Times Weekly           Physical Exam:           General Appearance:    No acute distress, alert and oriented x4   Lungs:     Clear to auscultation bilaterally     Heart:    Regular rhythm and normal rate.  II/VI SM USB   Abdomen:     Soft, non-tender, non-distended.    Extremities:   Moves all extremities well.  No clubbing, cyanosis, or edema.         Follow-up:  1.  Follow-up with NATHAN Thomas, in 1 week.  2.  Follow-up with me in 4 weeks.      Time Spent on Discharge: Greater than 30 minutes      Rico Sawant MD  03/05/20  12:42 PM

## 2020-03-05 NOTE — PLAN OF CARE
Problem: Patient Care Overview  Goal: Plan of Care Review  Outcome: Ongoing (interventions implemented as appropriate)  Flowsheets (Taken 3/5/2020 3763)  Progress: no change  Plan of Care Reviewed With: patient; spouse  Outcome Summary: VSS throughout shift. BP quite labile (100-140's) but remained below 150/ did not have to restart Cardene gtt. NPO at midnight for tentative stress test today. Finished 24H urine. Will continue to monitor.

## 2020-03-06 ENCOUNTER — TELEPHONE (OUTPATIENT)
Dept: CARDIOLOGY | Facility: CLINIC | Age: 65
End: 2020-03-06

## 2020-03-06 RX ORDER — CLONIDINE 0.1 MG/24H
1 PATCH, EXTENDED RELEASE TRANSDERMAL WEEKLY
Qty: 4 EACH | Refills: 3 | Status: SHIPPED | OUTPATIENT
Start: 2020-03-06 | End: 2020-04-14

## 2020-03-06 NOTE — TELEPHONE ENCOUNTER
"Spoke with pt. Went over Dr. Sawant's recommendations. She said she is not going to take the amlodipine or carvedilol because she gets a feeling that she can't sit or lay down. She said the feeling is in her stomach, but it is not pain, nausea or tingling. She can't describe it, but the medicines \"are killing her\". I went over risk factors with her. She said, \"stop saying that! You aren't helping me.\"     She said she wants to try the Clonidine patch and she will decide about the side effects for herself.    Spoke with Dr. Sawant about the conversation with the patient. Orders to discontinue carvedilol and amlodipine. Start Clonidine patch 0.1 mg/24 hours one weekly. Notified pt and sent script to pharmacy. She verbalized understanding.    Thank you!    Amirah Butler RN  Triage MG    "

## 2020-03-06 NOTE — TELEPHONE ENCOUNTER
Amirah or Michelle,    Can one of you please call this patient back for me?  She had a hypertensive crisis and was just discharged yesterday.  There is really is no option for a patch for her other than clonidine, which tends to have its own set of side effects.  We checked her meds in detail with the pharmacy while hospitalized.  None of the listed allergies are contained in either of these meds, and there are no preservatives in either.    She may be feeling off because of her BP running normally now.  I am very concerned that her BP will spike again off of these meds, and this places her at risk for stroke, cerebral hemorrhage, MI, and death.   Her BP was as high as 224/118 recently.  Please ask her to try to take these for several more days to see if the sensation subsides.  I do not have many options other than oral meds.  Thank you.      BEL

## 2020-03-06 NOTE — TELEPHONE ENCOUNTER
"Pt called to report that she is having some \" strange tense feeling\" from the amlodipine and coreg that she is taking. She states that she really is unable to describe in detail how she feels with the medications but it is a very unpleasant feeling and she would like to discontinue both.     She is requesting to be prescribed meds in patch form if possible as she is allergic to preservatives in most drugs.     Thanks   "

## 2020-03-07 LAB
5OH-INDOLEACETATE 24H UR-MCNC: 1.8 MG/L
5OH-INDOLEACETATE 24H UR-MRATE: 2 MG/24 HR (ref 0–14.9)

## 2020-03-09 LAB
ALDOST SERPL-MCNC: 1.3 NG/DL (ref 0–30)
ALDOST/RENIN PLAS-RTO: 1 {RATIO} (ref 0–30)
RENIN PLAS-CCNC: 1.26 NG/ML/HR (ref 0.17–5.38)

## 2020-03-12 LAB
DOPAMINE 24H UR-MRATE: 253 UG/24 HR (ref 0–510)
DOPAMINE UR-MCNC: 230 UG/L
EPINEPH 24H UR-MRATE: 8 UG/24 HR (ref 0–20)
EPINEPH UR-MCNC: 7 UG/L
NOREPINEPH 24H UR-MRATE: 59 UG/24 HR (ref 0–135)
NOREPINEPH UR-MCNC: 54 UG/L
VMA 24H UR-MRATE: 3.4 MG/24 HR (ref 0–7.5)
VMA UR-MCNC: 3.1 MG/L

## 2020-03-13 ENCOUNTER — HOSPITAL ENCOUNTER (EMERGENCY)
Facility: HOSPITAL | Age: 65
Discharge: HOME | End: 2020-03-13
Attending: EMERGENCY MEDICINE | Admitting: EMERGENCY MEDICINE

## 2020-03-13 ENCOUNTER — NURSE TRIAGE (OUTPATIENT)
Dept: CALL CENTER | Facility: HOSPITAL | Age: 65
End: 2020-03-13

## 2020-03-13 VITALS
HEIGHT: 67 IN | OXYGEN SATURATION: 97 % | BODY MASS INDEX: 21.03 KG/M2 | RESPIRATION RATE: 18 BRPM | SYSTOLIC BLOOD PRESSURE: 176 MMHG | DIASTOLIC BLOOD PRESSURE: 96 MMHG | WEIGHT: 134 LBS | HEART RATE: 75 BPM | TEMPERATURE: 98.3 F

## 2020-03-13 DIAGNOSIS — I10 POORLY-CONTROLLED HYPERTENSION: Primary | ICD-10-CM

## 2020-03-13 LAB
ANION GAP SERPL CALCULATED.3IONS-SCNC: 11.6 MMOL/L (ref 5–15)
BASOPHILS # BLD AUTO: 0.03 10*3/MM3 (ref 0–0.2)
BASOPHILS NFR BLD AUTO: 0.5 % (ref 0–1.5)
BUN BLD-MCNC: 19 MG/DL (ref 8–23)
BUN/CREAT SERPL: 24.1 (ref 7–25)
CALCIUM SPEC-SCNC: 9.4 MG/DL (ref 8.6–10.5)
CHLORIDE SERPL-SCNC: 101 MMOL/L (ref 98–107)
CO2 SERPL-SCNC: 25.4 MMOL/L (ref 22–29)
CREAT BLD-MCNC: 0.79 MG/DL (ref 0.57–1)
DEPRECATED RDW RBC AUTO: 40.7 FL (ref 37–54)
EOSINOPHIL # BLD AUTO: 0.06 10*3/MM3 (ref 0–0.4)
EOSINOPHIL NFR BLD AUTO: 1 % (ref 0.3–6.2)
ERYTHROCYTE [DISTWIDTH] IN BLOOD BY AUTOMATED COUNT: 12.4 % (ref 12.3–15.4)
GFR SERPL CREATININE-BSD FRML MDRD: 73 ML/MIN/1.73
GLUCOSE BLD-MCNC: 98 MG/DL (ref 65–99)
HCT VFR BLD AUTO: 37.9 % (ref 34–46.6)
HGB BLD-MCNC: 13 G/DL (ref 12–15.9)
IMM GRANULOCYTES # BLD AUTO: 0.03 10*3/MM3 (ref 0–0.05)
IMM GRANULOCYTES NFR BLD AUTO: 0.5 % (ref 0–0.5)
LYMPHOCYTES # BLD AUTO: 1.08 10*3/MM3 (ref 0.7–3.1)
LYMPHOCYTES NFR BLD AUTO: 18.3 % (ref 19.6–45.3)
MCH RBC QN AUTO: 31.1 PG (ref 26.6–33)
MCHC RBC AUTO-ENTMCNC: 34.3 G/DL (ref 31.5–35.7)
MCV RBC AUTO: 90.7 FL (ref 79–97)
MONOCYTES # BLD AUTO: 0.66 10*3/MM3 (ref 0.1–0.9)
MONOCYTES NFR BLD AUTO: 11.2 % (ref 5–12)
NEUTROPHILS # BLD AUTO: 4.03 10*3/MM3 (ref 1.7–7)
NEUTROPHILS NFR BLD AUTO: 68.5 % (ref 42.7–76)
NRBC BLD AUTO-RTO: 0 /100 WBC (ref 0–0.2)
PLATELET # BLD AUTO: 217 10*3/MM3 (ref 140–450)
PMV BLD AUTO: 10.8 FL (ref 6–12)
POTASSIUM BLD-SCNC: 3.8 MMOL/L (ref 3.5–5.2)
RBC # BLD AUTO: 4.18 10*6/MM3 (ref 3.77–5.28)
SODIUM BLD-SCNC: 138 MMOL/L (ref 136–145)
TROPONIN T SERPL-MCNC: <0.01 NG/ML (ref 0–0.03)
WBC NRBC COR # BLD: 5.89 10*3/MM3 (ref 3.4–10.8)

## 2020-03-13 PROCEDURE — 93005 ELECTROCARDIOGRAM TRACING: CPT | Performed by: EMERGENCY MEDICINE

## 2020-03-13 PROCEDURE — 84484 ASSAY OF TROPONIN QUANT: CPT | Performed by: EMERGENCY MEDICINE

## 2020-03-13 PROCEDURE — 93010 ELECTROCARDIOGRAM REPORT: CPT | Performed by: INTERNAL MEDICINE

## 2020-03-13 PROCEDURE — 85025 COMPLETE CBC W/AUTO DIFF WBC: CPT | Performed by: EMERGENCY MEDICINE

## 2020-03-13 PROCEDURE — 96374 THER/PROPH/DIAG INJ IV PUSH: CPT

## 2020-03-13 PROCEDURE — 99283 EMERGENCY DEPT VISIT LOW MDM: CPT

## 2020-03-13 PROCEDURE — 80048 BASIC METABOLIC PNL TOTAL CA: CPT | Performed by: EMERGENCY MEDICINE

## 2020-03-13 RX ORDER — LABETALOL HYDROCHLORIDE 5 MG/ML
10 INJECTION, SOLUTION INTRAVENOUS ONCE
Status: COMPLETED | OUTPATIENT
Start: 2020-03-13 | End: 2020-03-13

## 2020-03-13 RX ORDER — SODIUM CHLORIDE 0.9 % (FLUSH) 0.9 %
10 SYRINGE (ML) INJECTION AS NEEDED
Status: DISCONTINUED | OUTPATIENT
Start: 2020-03-13 | End: 2020-03-13 | Stop reason: HOSPADM

## 2020-03-13 RX ADMIN — LABETALOL HYDROCHLORIDE 10 MG: 5 INJECTION, SOLUTION INTRAVENOUS at 20:06

## 2020-03-13 NOTE — TELEPHONE ENCOUNTER
"    Reason for Disposition  • [1] Systolic BP  >= 160 OR Diastolic >= 100 AND [2] cardiac or neurologic symptoms (e.g., chest pain, difficulty breathing, unsteady gait, blurred vision)    Additional Information  • Negative: Difficult to awaken or acting confused (e.g., disoriented, slurred speech)  • Negative: Severe difficulty breathing (e.g., struggling for each breath, speaks in single words)  • Negative: [1] Weakness of the face, arm or leg on one side of the body AND [2] new onset  • Negative: [1] Numbness (i.e., loss of sensation) of the face, arm or leg on one side of the body AND [2] new onset  • Negative: [1] Chest pain lasts > 5 minutes AND [2] history of heart disease  (i.e., heart attack, bypass surgery, angina, angioplasty, CHF)  • Negative: [1] Chest pain AND [2] took nitrogylcerin AND [3] pain was not relieved  • Negative: Sounds like a life-threatening emergency to the triager  • Negative: Symptom is main concern  (e.g., headache, chest pain)  • Negative: Low blood pressure is main concern  • Negative: [1] Pregnant > 20 weeks (or postpartum < 6 weeks) AND [2] new hand or face swelling  • Negative: [1] Pregnant > 20 weeks AND [2] BP Systolic BP  >= 140 OR Diastolic >= 90  • Negative: [1] Systolic BP  >= 200 OR Diastolic >= 120  AND [2] having NO cardiac or neurologic symptoms  • Negative: [1] Postpartum < 6 weeks AND [2] BP Systolic BP  >= 140 OR Diastolic >= 90  • Negative: [1] Systolic BP  >= 180 OR Diastolic >= 110 AND [2] missed most recent dose of blood pressure medication  • Negative: Systolic BP  >= 180 OR Diastolic >= 110    Answer Assessment - Initial Assessment Questions  1. BLOOD PRESSURE: \"What is the blood pressure?\" \"Did you take at least two measurements 5 minutes apart?\"      Unwilling to take it but feels like she did before   2. ONSET: \"When did you take your blood pressure?\"      Refuses to take   3. HOW: \"How did you obtain the blood pressure?\" (e.g., visiting nurse, automatic home " "BP monitor)      N/a   4. HISTORY: \"Do you have a history of high blood pressure?\"      Yes   5. MEDICATIONS: \"Are you taking any medications for blood pressure?\" \"Have you missed any doses recently?\"      Changed patch this morning and just keeps feeling worse   6. OTHER SYMPTOMS: \"Do you have any symptoms?\" (e.g., headache, chest pain, blurred vision, difficulty breathing, weakness)      Chest heaviness, does not feel right   7. PREGNANCY: \"Is there any chance you are pregnant?\" \"When was your last menstrual period?\"      No    Protocols used: HIGH BLOOD PRESSURE-ADULT-AH      "

## 2020-03-13 NOTE — ED PROVIDER NOTES
" EMERGENCY DEPARTMENT ENCOUNTER    CHIEF COMPLAINT  Chief Complaint: Hypertension  History given by: patient  History limited by: nothing  Room Number: 35/35  PMD: Klaudia Britt MD      HPI:  Pt is a 65 y.o. female with recent admission for hypertensive urgency, who presents to the ED via private vehicle complaining of feeling like her blood pressure was elevated starting around 1700, after pt placed a new Catapres patch to her upper extremity, which pt was recently prescribed.  Pt also complains of upper abdominal discomfort, feeling \"flushed,\" and anxious as well as lightheaded, but denies headache or new shortness of breath, chest pain, nausea, vomiting, numbness, tingling, or focal weakness.      PAST MEDICAL HISTORY  Active Ambulatory Problems     Diagnosis Date Noted   • Hypertensive urgency 03/03/2020     Resolved Ambulatory Problems     Diagnosis Date Noted   • No Resolved Ambulatory Problems     Past Medical History:   Diagnosis Date   • Hyperlipidemia        PAST SURGICAL HISTORY  History reviewed. No pertinent surgical history.    FAMILY HISTORY  Family History   Problem Relation Age of Onset   • Heart attack Mother    • Hypertension Mother    • Hyperlipidemia Mother    • Heart attack Father    • Hypertension Father    • Hyperlipidemia Father    • Heart disease Father    • Heart attack Sister        SOCIAL HISTORY  Social History     Socioeconomic History   • Marital status:      Spouse name: Not on file   • Number of children: Not on file   • Years of education: Not on file   • Highest education level: Not on file   Tobacco Use   • Smoking status: Former Smoker       ALLERGIES  Methylchloroisothiazolinone [methylisothiazolinone] and [id number] alkyl acrulate crosspol    REVIEW OF SYSTEMS  Review of Systems   Constitutional: Negative for fever.        (+) feeling \"flushed\"   HENT: Negative for sore throat.    Eyes: Negative.    Respiratory: Negative for cough and shortness of " breath.    Cardiovascular: Negative for chest pain.   Gastrointestinal: Positive for abdominal pain. Negative for diarrhea and vomiting.   Genitourinary: Negative for dysuria.   Musculoskeletal: Negative for neck pain.   Skin: Negative for rash.   Allergic/Immunologic: Negative.    Neurological: Positive for light-headedness. Negative for dizziness, speech difficulty, weakness, numbness and headaches.   Hematological: Negative.    Psychiatric/Behavioral: The patient is nervous/anxious.    All other systems reviewed and are negative.    PHYSICAL EXAM  ED Triage Vitals [03/13/20 1833]   Temp Heart Rate Resp BP SpO2   98.3 °F (36.8 °C) 84 16 (!) 193/93 99 %      Temp src Heart Rate Source Patient Position BP Location FiO2 (%)   Tympanic -- -- -- --       Physical Exam   Constitutional: She is oriented to person, place, and time. No distress.   Eyes: EOM are normal.   Neck: Normal range of motion.   Cardiovascular: Normal rate and regular rhythm.   Pulmonary/Chest: Effort normal and breath sounds normal. No respiratory distress.   Neurological: She is alert and oriented to person, place, and time. She has normal sensation and normal strength.   Skin: Skin is warm and dry.   Psychiatric: Affect normal.   Nursing note and vitals reviewed.      LAB RESULTS  Lab Results (last 24 hours)     Procedure Component Value Units Date/Time    CBC & Differential [[id number]] Collected:  03/13/20 1957    Specimen:  Blood Updated:  03/13/20 2012    Narrative:       The following orders were created for panel order CBC & Differential.  Procedure                               Abnormality         Status                     ---------                               -----------         ------                     CBC Auto Differential[[id number]]        Abnormal            Final result                 Please view results for these tests on the individual orders.    Basic Metabolic Panel [[id number]]  (Normal) Collected:  03/13/20 1957     Specimen:  Blood Updated:  03/13/20 2038     Glucose 98 mg/dL      BUN 19 mg/dL      Creatinine 0.79 mg/dL      Sodium 138 mmol/L      Potassium 3.8 mmol/L      Chloride 101 mmol/L      CO2 25.4 mmol/L      Calcium 9.4 mg/dL      eGFR Non African Amer 73 mL/min/1.73      BUN/Creatinine Ratio 24.1     Anion Gap 11.6 mmol/L     Narrative:       GFR Normal >60  Chronic Kidney Disease <60  Kidney Failure <15      Troponin [[id number]]  (Normal) Collected:  03/13/20 1957    Specimen:  Blood Updated:  03/13/20 2038     Troponin T <0.010 ng/mL     Narrative:       Troponin T Reference Range:  <= 0.03 ng/mL-   Negative for AMI  >0.03 ng/mL-     Abnormal for myocardial necrosis.  Clinicians would have to utilize clinical acumen, EKG, Troponin and serial changes to determine if it is an Acute Myocardial Infarction or myocardial injury due to an underlying chronic condition.       Results may be falsely decreased if patient taking Biotin.      CBC Auto Differential [[id number]]  (Abnormal) Collected:  03/13/20 1957    Specimen:  Blood Updated:  03/13/20 2012     WBC 5.89 10*3/mm3      RBC 4.18 10*6/mm3      Hemoglobin 13.0 g/dL      Hematocrit 37.9 %      MCV 90.7 fL      MCH 31.1 pg      MCHC 34.3 g/dL      RDW 12.4 %      RDW-SD 40.7 fl      MPV 10.8 fL      Platelets 217 10*3/mm3      Neutrophil % 68.5 %      Lymphocyte % 18.3 %      Monocyte % 11.2 %      Eosinophil % 1.0 %      Basophil % 0.5 %      Immature Grans % 0.5 %      Neutrophils, Absolute 4.03 10*3/mm3      Lymphocytes, Absolute 1.08 10*3/mm3      Monocytes, Absolute 0.66 10*3/mm3      Eosinophils, Absolute 0.06 10*3/mm3      Basophils, Absolute 0.03 10*3/mm3      Immature Grans, Absolute 0.03 10*3/mm3      nRBC 0.0 /100 WBC           I ordered the above labs and reviewed the results.      PROGRESS AND CONSULTS    ED Course as of Mar 13 2247   Fri Mar 13, 2020   1937 Old records reviewed.  Patient was admitted here 3/3 through 3/5/2020 for hypertensive urgency and  chest pain.  She was initially placed on a Cardene drip.  She was started on amlodipine 5 mg daily and carvedilol 3.125 mg twice daily.  Renal artery Doppler showed normal renal arteries.  Echocardiogram showed an EF of 60 to 65% with mild LVH.  Cardiolite stress test was normal.  Patient was advised to discontinue her hormone replacement therapy. Several days after discharge, the patient began to feel like she was having an adverse reaction to her new medications.  She stopped taking amlodipine and carvedilol and was started on a clonidine patch.    [WH]   2025 EKG          EKG time: 2019  Rhythm/Rate: Sinus rhythm rate 62  P waves and WI: LAE, normal  QRS, axis: LAD, LVH, anterior Q waves  ST and T waves: Normal    Interpreted Contemporaneously by me, independently viewed  EKG is not significantly changed compared to prior EKG done 3/4/2020      []   2058 Test results discussed with the patient.  Patient is resting comfortably.  She states she feels much better.  Blood pressure is now 161/86 after IV labetalol.  I advised the patient to check her blood pressure at least twice daily over the weekend and to take 1 dose of Coreg if her blood pressure remains greater than 190/110.  I also advised her to call Dr. Sawant early next week.  Return precautions were discussed with the patient.    [WH]   2246 Patient presented to the ER with hypertension.  Her exam and work-up were unremarkable.  Blood pressure improved with 1 dose of IV labetalol.  EKG was unchanged.  Troponin was negative.  Patient was advised to follow-up with Dr. Sawant early next week.    []      ED Course User Index  [] Manav Gregg MD     1945- HR 86. O2 sat 98% on RA. /106. Discussed with pt and family plan to obtain labs, as well as treat hypertension with Labetalol. Pt understands and agrees with the plan, all questions answered. Ordered labs and EKG for further evaluation. Also ordered Labetolol for symptom  management.    MEDICAL DECISION MAKING  Results were reviewed/discussed with the patient and they were also made aware of online access. Pt also made aware that some labs, such as cultures, will not be resulted during ER visit and follow up with PMD is necessary.          DIAGNOSIS  Final diagnoses:   Poorly-controlled hypertension       DISPOSITION  DISCHARGE    Patient discharged in stable condition.    Reviewed implications of results, diagnosis, meds, responsibility to follow up, warning signs and symptoms of possible worsening, potential complications and reasons to return to ER, including worsening symptoms.    Patient/Family voiced understanding of above instructions.    Discussed plan for discharge, as there is no emergent indication for admission. Patient referred to primary care provider for BP management due to today's BP. Pt/family is agreeable and understands need for follow up and repeat testing.  Pt is aware that discharge does not mean that nothing is wrong but it indicates no emergency is present that requires admission and they must continue care with follow-up as given below or physician of their choice.     FOLLOW-UP  Rico Sawant MD  [address]  [facility] [id number] [phone]    Call in 3 days      Klaudia Britt MD  [address] [facility] [id number] [phone]    Go in 1 week  As scheduled         Medication List      No changes were made to your prescriptions during this visit.           Latest Documented Vital Signs:  As of 22:47  BP- 176/96 HR- 75 Temp- 98.3 °F (36.8 °C) (Tympanic) O2 sat- 97%    --  Documentation assistance provided by flakita Willis for Dr. Gregg.  Information recorded by the scribe was done at my direction and has been verified and validated by me.     Mallika Willis  03/13/20 7315       Manav Gregg MD  03/13/20 7105

## 2020-03-14 NOTE — DISCHARGE INSTRUCTIONS
Check and record your blood pressure at least twice daily over the next few days.  If your blood pressure is elevated, go lie down in a quiet room for 30 minutes.  If blood pressure continues to be greater than 190/110 after doing this, take 1 dose of Coreg/carvedilol.  Return to the emergency department for worsening symptoms, headache, dizziness, chest pain, shortness of breath, or other concern.  Call Dr. Sawant's office early next week if blood pressure remains elevated.

## 2020-04-14 ENCOUNTER — OFFICE VISIT (OUTPATIENT)
Dept: CARDIOLOGY | Facility: CLINIC | Age: 65
End: 2020-04-14

## 2020-04-14 VITALS
HEIGHT: 67 IN | WEIGHT: 136.6 LBS | BODY MASS INDEX: 21.44 KG/M2 | OXYGEN SATURATION: 98 % | HEART RATE: 76 BPM | SYSTOLIC BLOOD PRESSURE: 170 MMHG | DIASTOLIC BLOOD PRESSURE: 102 MMHG

## 2020-04-14 DIAGNOSIS — I10 ESSENTIAL HYPERTENSION: Primary | ICD-10-CM

## 2020-04-14 DIAGNOSIS — E78.5 HYPERLIPIDEMIA, UNSPECIFIED HYPERLIPIDEMIA TYPE: ICD-10-CM

## 2020-04-14 DIAGNOSIS — Z82.49 FAMILY HISTORY OF CORONARY ARTERY DISEASE: ICD-10-CM

## 2020-04-14 PROCEDURE — 99214 OFFICE O/P EST MOD 30 MIN: CPT | Performed by: INTERNAL MEDICINE

## 2020-04-14 RX ORDER — CARVEDILOL 3.12 MG/1
3.12 TABLET ORAL DAILY
COMMUNITY
Start: 2020-03-30

## 2020-04-14 RX ORDER — AMLODIPINE BESYLATE 5 MG/1
5 TABLET ORAL DAILY
COMMUNITY
Start: 2020-04-06 | End: 2021-01-19

## 2020-04-15 NOTE — PROGRESS NOTES
"Date of Office Visit: 2020  Encounter Provider: Rico Sawant MD  Place of Service: [facility] CARDIOLOGY  Patient Name: Alicia Lan  :1955    Chief complaint: Follow-up for hypertensive urgency, hyperlipidemia, and family  history of coronary artery disease.    History of Present Illness:    I again had the pleasure of seeing the patient in cardiology office on 2020.  She is a very pleasant 65 year-old white female with a history of hypertensive urgency, family history of coronary artery disease, and hyperlipidemia with statin intolerance.      I originally saw her in our [facility] unit on 3/3/2020.  For approximately 4 to 6 weeks, she had been experiencing intermittent chest tightness across her precordium, both with and without exertion.  She also had been more short of breath with exertion.  She was grossly hypertensive in the [facility] unit with a blood pressure of 200/99 on the left, and she was very flushed.  Her blood pressure at home is been as high as 224/118 the night before.  An EKG showed a left anterior fascicular block, as well as T wave changes in the septal leads.  She does have a significant family history of coronary artery disease, including her sister with an MI at age 70 with 4 subsequent stents, her father with a CABG and valve replacement surgery, and her mother dying suddenly at age 74 (possibly from an MI).    The patient was admitted after her initial visit in the [facility], and was placed on a Cardene drip for blood pressure control.  She was placed on amlodipine and carvedilol, although she was concerned about taking oral medications because she has a \"preservative\" allergy.  She actually tolerated both medications.  A renal artery Doppler ultrasound on 3/4/2020 showed normal renal arteries bilaterally.  An echocardiogram on 3/4/2020 showed an ejection fraction of 60 to 65%, mild LVH, aortic sclerosis, and mild aortic insufficiency.  She also had an " exercise Cardiolite stress test on 3/5/2020 which was normal with no evidence of ischemia.  It was felt that the cause of all of her symptoms was uncontrolled and severe hypertension.    The patient presents today for follow-up.  Her only complaint is that she has been having some flushing after taking the amlodipine.  She actually was concerned about her blood pressure spiking as she had flushing when her blood pressure was severely elevated in the past as well.  Her blood pressure has been fairly well controlled, and she has a detailed blood pressure log which is very organized.  Over the last several weeks, she mainly runs in the 110-130 systolic range, and the 70s to 80s diastolic range.  She has not had any chest pain or shortness of breath, and overall she feels much better.    Past Medical History:   Diagnosis Date   • Aortic valve sclerosis    • Chest pain    • Hyperlipidemia    • Hypertensive urgency    • LAFB (left anterior fascicular block)    • Poorly-controlled hypertension        History reviewed. No pertinent surgical history.    Current Outpatient Medications on File Prior to Visit   Medication Sig Dispense Refill   • amLODIPine (NORVASC) 5 MG tablet Take 5 mg by mouth Daily.     • carvedilol (COREG) 3.125 MG tablet Take 3.125-6.25 mg by mouth 2 (Two) Times a Day.     • estradiol-norethindrone (COMBIPATCH) 0.05-0.14 MG/DAY patch Place 1 patch on the skin as directed by provider 2 (Two) Times a Week.     • Multiple Vitamins-Minerals (MULTI COMPLETE PO) Take  by mouth.     • VITAMIN D PO Take  by mouth.     • [DISCONTINUED] cloNIDine (CATAPRES-TTS-1) 0.1 MG/24HR patch Place 1 patch on the skin as directed by provider 1 (One) Time Per Week. 4 each 3     No current facility-administered medications on file prior to visit.      Allergies as of 04/14/2020 - Reviewed 04/14/2020   Allergen Reaction Noted   • Methylchloroisothiazolinone [methylisothiazolinone] Swelling 03/03/2020   • [id number] alkyl  "dolly crossmeghann Hives 03/03/2020     Social History     Socioeconomic History   • Marital status:      Spouse name: Not on file   • Number of children: Not on file   • Years of education: Not on file   • Highest education level: Not on file   Tobacco Use   • Smoking status: Former Smoker     Family History   Problem Relation Age of Onset   • Heart attack Mother    • Hypertension Mother    • Hyperlipidemia Mother    • Heart attack Father    • Hypertension Father    • Hyperlipidemia Father    • Heart disease Father    • Heart attack Sister        Review of Systems   Constitution: Positive for malaise/fatigue.   All other systems reviewed and are negative.     Objective:     Vitals:    04/14/20 1418   BP: (!) 170/102   Pulse: 76   SpO2: 98%   Weight: 62 kg (136 lb 9.6 oz)   Height: 170.2 cm (67.01\")     Body mass index is 21.39 kg/m².    Physical Exam   Constitutional: She is oriented to person, place, and time. She appears well-developed and well-nourished.   HENT:   Head: Normocephalic and atraumatic.   Eyes: Conjunctivae are normal.   Neck: Neck supple.   Cardiovascular: Normal rate and regular rhythm. Exam reveals no gallop and no friction rub.   Murmur heard.   Systolic murmur is present with a grade of 2/6 at the upper right sternal border.  Pulmonary/Chest: Effort normal and breath sounds normal.   Abdominal: Soft. There is no tenderness.   Musculoskeletal: She exhibits no edema.   Neurological: She is alert and oriented to person, place, and time.   Skin: Skin is warm.   Psychiatric: She has a normal mood and affect. Her behavior is normal.     Lab Review:   Procedures    Cardiac Procedures:  1.  Renal artery ultrasound on 3/4/2020: Normal bilaterally.  2.  Echocardiogram on 3/4/2020: The ejection fraction was 60 to 65%.  There was mild LVH.  There was grade 1 diastolic dysfunction.  The right ventricle was normal.  The aortic valve was moderately calcified (aortic sclerosis).  There was mild aortic " insufficiency.  3.  Exercise Cardiolite stress test on 3/5/2020: Normal with no ischemia.    Assessment:       Diagnosis Plan   1. Essential hypertension     2. Family history of coronary artery disease     3. Hyperlipidemia, unspecified hyperlipidemia type       Plan:       Overall, I feel that she is much better.  I reassured her about the minor flushing after taking the amlodipine.  I really would like to keep her on this as she has had an excellent response to these 2 medications.  I have advised her to cut the amlodipine in the 2.5 mg tablets.  She will start with 1 tablet.  If her blood pressure goes up, she will then start to take 1 tablet in the morning and 1 tablet in the evening.  She will remain on the carvedilol at 3.125 mg twice daily.  Again, she had been very concerned about taking oral medications because she has a preservative allergy, although she has done fairly well with both of these.  I feel that all of her symptoms were caused by a hypertensive urgency in the past.  She does have aortic sclerosis and mild aortic insufficiency.  She will need another echocardiogram in the future, although this can be in several years.  I will plan on seeing her back in the office in 6 months unless other issues arise.    I spent 30 minutes in the care of the patient.  Greater than 50% of this time was spent in face-to-face counseling with the patient regarding her hypertension and her prior hospital testing.

## 2020-04-16 ENCOUNTER — TELEPHONE (OUTPATIENT)
Dept: CARDIOLOGY | Facility: CLINIC | Age: 65
End: 2020-04-16

## 2020-04-16 NOTE — TELEPHONE ENCOUNTER
04/16/2020   Pt called back she is unable to take the 1/2 dose of Amlodipine. She states she tried but can not take it.   She states she is going Okay taking Carvedilol.   Please advise   Pt's call back # Home [phone]   Cell # [phone]  Thanks Danny VASQUEZ

## 2020-04-16 NOTE — TELEPHONE ENCOUNTER
Danny,    Please ask her if she is willing to go up on the carvedilol to 6.25 mg bid.  If so, can you please send it in?  She can stop the amlodipine.  She should let me know if her BP goes up only on the carvedilol.  Thanks    BEL

## 2020-04-17 NOTE — TELEPHONE ENCOUNTER
I spoke with pt. She had stopped Amlodipine yesterday. She feels better today.   She is going to try the increase dose tonight and tomorrow. She will update us next week on how she is doing and if needs a new Rx sent in.   She will call if any other issues arise.  Danny VASQUEZ

## 2020-09-16 ENCOUNTER — TRANSCRIBE ORDERS (OUTPATIENT)
Dept: ADMINISTRATIVE | Facility: HOSPITAL | Age: 65
End: 2020-09-16

## 2020-09-16 DIAGNOSIS — R07.9 CHEST PAIN, UNSPECIFIED TYPE: Primary | ICD-10-CM

## 2020-09-25 ENCOUNTER — APPOINTMENT (OUTPATIENT)
Dept: CT IMAGING | Facility: HOSPITAL | Age: 65
End: 2020-09-25

## 2020-09-28 ENCOUNTER — HOSPITAL ENCOUNTER (OUTPATIENT)
Dept: CT IMAGING | Facility: HOSPITAL | Age: 65
Discharge: HOME | End: 2020-09-28
Admitting: FAMILY MEDICINE

## 2020-09-28 DIAGNOSIS — R07.9 CHEST PAIN, UNSPECIFIED TYPE: ICD-10-CM

## 2020-09-28 PROCEDURE — 71250 CT THORAX DX C-: CPT

## 2021-01-19 ENCOUNTER — OFFICE VISIT (OUTPATIENT)
Dept: CARDIOLOGY | Facility: CLINIC | Age: 66
End: 2021-01-19

## 2021-01-19 VITALS
BODY MASS INDEX: 21.03 KG/M2 | HEIGHT: 67 IN | OXYGEN SATURATION: 98 % | DIASTOLIC BLOOD PRESSURE: 100 MMHG | WEIGHT: 134 LBS | HEART RATE: 74 BPM | SYSTOLIC BLOOD PRESSURE: 164 MMHG

## 2021-01-19 DIAGNOSIS — I16.0 HYPERTENSIVE URGENCY: Primary | ICD-10-CM

## 2021-01-19 DIAGNOSIS — E78.49 OTHER HYPERLIPIDEMIA: ICD-10-CM

## 2021-01-19 DIAGNOSIS — Z82.49 FAMILY HISTORY OF CORONARY ARTERY DISEASE: ICD-10-CM

## 2021-01-19 PROCEDURE — 99213 OFFICE O/P EST LOW 20 MIN: CPT | Performed by: INTERNAL MEDICINE

## 2021-01-24 NOTE — PROGRESS NOTES
"Date of Office Visit:  2021  Encounter Provider: Rico Sawant MD  Place of Service: [facility] CARDIOLOGY  Patient Name: Alicia Lan  :1955    Chief complaint: Follow-up for hypertensive urgency, hyperlipidemia, and family  history of coronary artery disease.    History of Present Illness:    I again had the pleasure of seeing the patient in cardiology office on 2021.  She is a very pleasant 66 year-old white female with a history of hypertensive urgency, family history of coronary artery disease, and hyperlipidemia with statin intolerance.      I originally saw her in our CEC unit on 3/3/2020.  For approximately 4 to 6 weeks, she had been experiencing intermittent chest tightness across her precordium, both with and without exertion.  She also had been more short of breath with exertion.  She was grossly hypertensive in the CEC unit with a blood pressure of 200/99 on the left, and she was very flushed.  Her blood pressure at home is been as high as 224/118 the night before.  An EKG showed a left anterior fascicular block, as well as T wave changes in the septal leads.  She does have a significant family history of coronary artery disease, including her sister with an MI at age 70 with 4 subsequent stents, her father with a CABG and valve replacement surgery, and her mother dying suddenly at age 74 (possibly from an MI).    The patient was admitted after her initial visit in the CEC, and was placed on a Cardene drip for blood pressure control.  She was placed on amlodipine and carvedilol, although she was concerned about taking oral medications because she has a \"preservative\" allergy.  She actually tolerated both medications.  A renal artery Doppler ultrasound on 3/4/2020 showed normal renal arteries bilaterally.  An echocardiogram on 3/4/2020 showed an ejection fraction of 60 to 65%, mild LVH, aortic sclerosis, and mild aortic insufficiency.  She also had " an exercise Cardiolite stress test on 3/5/2020 which was normal with no evidence of ischemia.  It was felt that the cause of all of her symptoms was uncontrolled and severe hypertension. She eventually developed flushing with amlodipine, and discontinued this.    The patient presents today for follow-up. Her blood pressure is 164/100 today. She actually cut back on the Coreg on her own from 6.25 mg twice a day to 3.125 mg only once a day. She states that she felt poorly on it. She has a blood pressure log which actually shows her blood pressure to be on the lower side with systolic readings in the upper 90s to lower 110s over 60s to 70s. She denied any chest pain or shortness of breath.    Past Medical History:   Diagnosis Date   • Aortic valve sclerosis    • Chest pain    • Hyperlipidemia    • Hypertensive urgency    • LAFB (left anterior fascicular block)    • Poorly-controlled hypertension        History reviewed. No pertinent surgical history.    Current Outpatient Medications on File Prior to Visit   Medication Sig Dispense Refill   • carvedilol (COREG) 3.125 MG tablet Take 3.125 mg by mouth Daily.     • estradiol-norethindrone (COMBIPATCH) 0.05-0.14 MG/DAY patch Place 1 patch on the skin as directed by provider 2 (Two) Times a Week.     • Multiple Vitamins-Minerals (MULTI COMPLETE PO) Take  by mouth.     • VITAMIN D PO Take  by mouth.       No current facility-administered medications on file prior to visit.      Allergies as of 01/19/2021 - Reviewed 01/19/2021   Allergen Reaction Noted   • Methylchloroisothiazolinone [methylisothiazolinone] Swelling 03/03/2020   • [id number] alkyl acrulate crosspol Hives 03/03/2020     Social History     Socioeconomic History   • Marital status:      Spouse name: Not on file   • Number of children: Not on file   • Years of education: Not on file   • Highest education level: Not on file   Tobacco Use   • Smoking status: Former Smoker     Family History   Problem  "Relation Age of Onset   • Heart attack Mother    • Hypertension Mother    • Hyperlipidemia Mother    • Heart attack Father    • Hypertension Father    • Hyperlipidemia Father    • Heart disease Father    • Heart attack Sister        Review of Systems   Constitution: Positive for malaise/fatigue.   All other systems reviewed and are negative.     Objective:     Vitals:    01/19/21 1410   BP: 164/100   Pulse: 74   SpO2: 98%   Weight: 60.8 kg (134 lb)   Height: 170.2 cm (67.01\")     Body mass index is 20.98 kg/m².    Physical Exam   Constitutional: She is oriented to person, place, and time. She appears well-developed and well-nourished.   HENT:   Head: Normocephalic and atraumatic.   Eyes: Conjunctivae are normal.   Neck: Neck supple.   Cardiovascular: Normal rate and regular rhythm. Exam reveals no gallop and no friction rub.   Murmur heard.   Systolic murmur is present with a grade of 2/6 at the upper right sternal border.  Pulmonary/Chest: Effort normal and breath sounds normal.   Abdominal: Soft. There is no abdominal tenderness.   Musculoskeletal:         General: No edema.   Neurological: She is alert and oriented to person, place, and time.   Skin: Skin is warm.   Psychiatric: She has a normal mood and affect. Her behavior is normal.     Lab Review:   Procedures    Cardiac Procedures:  1.  Renal artery ultrasound on 3/4/2020: Normal bilaterally.  2.  Echocardiogram on 3/4/2020: The ejection fraction was 60 to 65%.  There was mild LVH.  There was grade 1 diastolic dysfunction.  The right ventricle was normal.  The aortic valve was moderately calcified (aortic sclerosis).  There was mild aortic insufficiency.  3.  Exercise Cardiolite stress test on 3/5/2020: Normal with no ischemia.    Assessment:       Diagnosis Plan   1. Hypertensive urgency     2. Other hyperlipidemia     3. Family history of coronary artery disease       Plan:       She is very hesitant about medications, and does not like to take " medications. She also states that she felt like she had flushing with amlodipine, and just felt poorly in general on Coreg. The amlodipine has been discontinued, and she has actually decreased the Coreg from 6.25 mg twice a day to 3.125 mg once a day she has a detailed blood pressure log from home which shows her blood pressure to be on the lower side actually. However, I am not truly convinced that it is controlled on just once a day Coreg. I expressed my concerns to her. She feels fine, and really does not want to take anymore medications. Her blood pressure was also elevated and Dr. Britt's office in Aug 2020. I had her blood pressure checked again before she left the office, and it really was not changed significantly. I respect her wishes, although if she has problems in the future, this certainly needs to be readdressed. She will need a repeat echocardiogram at some point to reevaluate the aortic sclerosis and mild aortic insufficiency. I do not feel this needs to be done currently. I will have her return to the office in 8 months unless other issues arise.

## 2021-03-11 ENCOUNTER — TRANSCRIBE ORDERS (OUTPATIENT)
Dept: ADMINISTRATIVE | Facility: HOSPITAL | Age: 66
End: 2021-03-11

## 2021-03-11 DIAGNOSIS — R91.8 LUNG MASS: Primary | ICD-10-CM

## 2021-03-11 DIAGNOSIS — R91.8 LUNG FIELD ABNORMAL: ICD-10-CM

## 2021-03-22 ENCOUNTER — BULK ORDERING (OUTPATIENT)
Dept: CASE MANAGEMENT | Facility: OTHER | Age: 66
End: 2021-03-22

## 2021-03-22 DIAGNOSIS — Z23 IMMUNIZATION DUE: ICD-10-CM

## 2021-03-26 ENCOUNTER — APPOINTMENT (OUTPATIENT)
Dept: CT IMAGING | Facility: HOSPITAL | Age: 66
End: 2021-03-26

## 2021-12-20 ENCOUNTER — OFFICE VISIT (OUTPATIENT)
Dept: CARDIOLOGY | Facility: CLINIC | Age: 66
End: 2021-12-20

## 2021-12-20 VITALS
WEIGHT: 135 LBS | OXYGEN SATURATION: 98 % | HEIGHT: 67 IN | DIASTOLIC BLOOD PRESSURE: 72 MMHG | BODY MASS INDEX: 21.19 KG/M2 | HEART RATE: 64 BPM | SYSTOLIC BLOOD PRESSURE: 124 MMHG

## 2021-12-20 DIAGNOSIS — Z82.49 FAMILY HISTORY OF CORONARY ARTERY DISEASE: ICD-10-CM

## 2021-12-20 DIAGNOSIS — E78.5 HYPERLIPIDEMIA, UNSPECIFIED HYPERLIPIDEMIA TYPE: ICD-10-CM

## 2021-12-20 DIAGNOSIS — I16.0 HYPERTENSIVE URGENCY: Primary | ICD-10-CM

## 2021-12-20 PROCEDURE — 99213 OFFICE O/P EST LOW 20 MIN: CPT | Performed by: INTERNAL MEDICINE

## 2021-12-20 RX ORDER — ASPIRIN 81 MG/1
81 TABLET ORAL DAILY
COMMUNITY

## 2022-01-11 NOTE — PROGRESS NOTES
"Date of Office Visit: 2021  Encounter Provider: Rico Sawant MD  Place of Service: [facility] CARDIOLOGY  Patient Name: Alicia Lan  :1955    Chief complaint: Follow-up for hypertensive urgency, hyperlipidemia, and family history of coronary artery disease.    History of Present Illness:    I again had the pleasure of seeing the patient in cardiology office on 2021.  She is a very pleasant 66 year-old white female with a history of hypertensive urgency, family history of coronary artery disease, and hyperlipidemia with statin intolerance.      I originally saw her in our CEC unit on 3/3/2020.  For approximately 4 to 6 weeks, she had been experiencing intermittent chest tightness across her precordium, both with and without exertion.  She also had been more short of breath with exertion.  She was grossly hypertensive in the CEC unit with a blood pressure of 200/99 on the left, and she was very flushed.  Her blood pressure at home is been as high as 224/118 the night before.  An EKG showed a left anterior fascicular block, as well as T wave changes in the septal leads.  She does have a significant family history of coronary artery disease, including her sister with an MI at age 70 with 4 subsequent stents, her father with a CABG and valve replacement surgery, and her mother dying suddenly at age 74 (possibly from an MI).    The patient was admitted after her initial visit in the CEC, and was placed on a Cardene drip for blood pressure control.  She was placed on amlodipine and carvedilol, although she was concerned about taking oral medications because she has a \"preservative\" allergy.  She actually tolerated both medications.  A renal artery Doppler ultrasound on 3/4/2020 showed normal renal arteries bilaterally.  An echocardiogram on 3/4/2020 showed an ejection fraction of 60 to 65%, mild LVH, aortic sclerosis, and mild aortic insufficiency.  She also had " an exercise Cardiolite stress test on 3/5/2020 which was normal with no evidence of ischemia.  It was felt that the cause of all of her symptoms was uncontrolled and severe hypertension. She eventually developed flushing with amlodipine, and discontinued this.    The patient presents today for follow-up.  She only takes the carvedilol as needed at this point.  She works out every day, and has not had any chest pain or shortness of breath.  She tells me that her blood pressure has actually been very well controlled.  Her blood pressure today is 124/72.    Past Medical History:   Diagnosis Date   • Aortic valve sclerosis    • Chest pain    • Hyperlipidemia    • Hypertensive urgency    • LAFB (left anterior fascicular block)    • Poorly-controlled hypertension        History reviewed. No pertinent surgical history.    Current Outpatient Medications on File Prior to Visit   Medication Sig Dispense Refill   • aspirin 81 MG EC tablet Take 81 mg by mouth Daily.     • carvedilol (COREG) 3.125 MG tablet Take 3.125 mg by mouth Daily.     • estradiol-norethindrone (COMBIPATCH) 0.05-0.14 MG/DAY patch Place 1 patch on the skin as directed by provider 2 (Two) Times a Week.     • Multiple Vitamins-Minerals (MULTI COMPLETE PO) Take  by mouth.     • VITAMIN D PO Take  by mouth.       No current facility-administered medications on file prior to visit.     Allergies as of 12/20/2021 - Reviewed 01/23/2021   Allergen Reaction Noted   • Methylchloroisothiazolinone [methylisothiazolinone] Swelling 03/03/2020   • [id number] alkyl acrulate crosspol Hives 03/03/2020     Social History     Socioeconomic History   • Marital status:    Tobacco Use   • Smoking status: Former Smoker     Family History   Problem Relation Age of Onset   • Heart attack Mother    • Hypertension Mother    • Hyperlipidemia Mother    • Heart attack Father    • Hypertension Father    • Hyperlipidemia Father    • Heart disease Father    • Heart attack Sister  "       Review of Systems   Constitutional: Positive for malaise/fatigue.   All other systems reviewed and are negative.     Objective:     Vitals:    12/20/21 1420   BP: 124/72   Pulse: 64   SpO2: 98%   Weight: 61.2 kg (135 lb)   Height: 170.2 cm (67.01\")     Body mass index is 21.14 kg/m².    Physical Exam  Constitutional:       Appearance: She is well-developed.   HENT:      Head: Normocephalic and atraumatic.   Eyes:      Conjunctiva/sclera: Conjunctivae normal.   Cardiovascular:      Rate and Rhythm: Normal rate and regular rhythm.      Heart sounds: Murmur heard.    Systolic murmur is present with a grade of 2/6 at the upper right sternal border.  No friction rub. No gallop.    Pulmonary:      Effort: Pulmonary effort is normal.      Breath sounds: Normal breath sounds.   Abdominal:      Palpations: Abdomen is soft.      Tenderness: There is no abdominal tenderness.   Musculoskeletal:      Cervical back: Neck supple.   Skin:     General: Skin is warm.   Neurological:      Mental Status: She is alert and oriented to person, place, and time.   Psychiatric:         Behavior: Behavior normal.       Lab Review:   Procedures    Cardiac Procedures:  1.  Renal artery ultrasound on 3/4/2020: Normal bilaterally.  2.  Echocardiogram on 3/4/2020: The ejection fraction was 60 to 65%.  There was mild LVH.  There was grade 1 diastolic dysfunction.  The right ventricle was normal.  The aortic valve was moderately calcified (aortic sclerosis).  There was mild aortic insufficiency.  3.  Exercise Cardiolite stress test on 3/5/2020: Normal with no ischemia.    Assessment:       Diagnosis Plan   1. Hypertensive urgency     2. Hyperlipidemia, unspecified hyperlipidemia type     3. Family history of coronary artery disease       Plan:       Again, she is only taking the carvedilol 2 tablets when her blood pressure goes up.  She is working out every day without any chest pain or shortness of breath.  She is watching her blood " pressure.  Today it is 124/72.  She has been very hesitant about taking medications, and does not like to take medications unless absolutely needed.  She felt flushing with amlodipine in the past.  Her stress test and echocardiogram in March 2020 both looked good other than mild aortic insufficiency.  She will need a repeat echocardiogram at some point in the future, although this is not needed currently.  For now, I did not change any of her medications.  I will see her back in 1 month unless other issues arise.

## 2023-03-28 ENCOUNTER — TELEPHONE (OUTPATIENT)
Dept: FAMILY MEDICINE CLINIC | Facility: CLINIC | Age: 68
End: 2023-03-28
Payer: COMMERCIAL

## 2023-03-28 DIAGNOSIS — Z78.0 POSTMENOPAUSAL: Primary | ICD-10-CM

## 2023-03-28 NOTE — TELEPHONE ENCOUNTER
Caller: Alicia Lan    Relationship: Self    Best call back number: [phone]    What specialty or service is being requested: GYNECOLOGY    Any additional details: PATIENT REQUESTS A REFERRAL FOR A GYNECOLOGIST FOR GENERAL CARE, AND WOULD TO KNOW WHO DR HELTON RECOMMENDS. PLEASE CALL TO FOLLOW UP

## 2023-03-28 NOTE — TELEPHONE ENCOUNTER
I recommend [facility] and I can place that referral for her.  I will need a reason.  Pap's aren't needed over the age of 65/70 typically but they can manage her mammograms and menopause and hormones.

## 2023-06-02 RX ORDER — ESTRADIOL 0.05 MG/D
PATCH, EXTENDED RELEASE TRANSDERMAL
Qty: 24 PATCH | Refills: 0 | Status: SHIPPED | OUTPATIENT
Start: 2023-06-02

## 2023-06-02 RX ORDER — ESTRADIOL/NORETHINDRONE ACETATE TRANSDERMAL SYSTEM .05; .25 MG/D; MG/D
PATCH, EXTENDED RELEASE TRANSDERMAL
Qty: 24 PATCH | Refills: 0 | Status: SHIPPED | OUTPATIENT
Start: 2023-06-02

## 2023-08-20 PROBLEM — M75.41 IMPINGEMENT SYNDROME OF RIGHT SHOULDER: Status: ACTIVE | Noted: 2020-02-03

## 2023-08-20 NOTE — PROGRESS NOTES
Chief Complaint  No chief complaint on file.    Subjective    {CC  Problem List  Visit  Diagnosis   Encounters  Notes  Medications  Labs  Result Review Imaging  Media :23}     Alicia Lan presents to [facility] [place] for No chief complaint on file..    History of Present Illness     Annual Exam    Last pap smear: 2018 and HPV negative  Last mammogram: patient refuses and understands the risk cancer, clots and premature dealth.   DEXA: ***  Last colonoscopy: 2009 and had a Cologuard in 2021  Ever screened for Hepatitis C: ***  Vaccines: ***  Exercise: ***  Smoking status: ***  Alcohol use: ***  Sunscreen use: ***      Review of Systems     Objective       Vital Signs:   There were no vitals taken for this visit.    There is no height or weight on file to calculate BMI.       PHQ-9 Total Score:       Physical Exam     Result Review  Data Reviewed:{ Labs  Result Review  Imaging  Med Tab  Media :23}   {Ambulatory Labs (Optional):31362}            Discussed healthy diet, exercise, adequate sleep, cancer screening, immunizations and preventative care. Annual eye exam and routine dental cleaning encouraged.        Assessment and Plan {CC Problem List  Visit Diagnosis  ROS  Review (Popup)  Health Maintenance  Quality  BestPractice  Medications  SmartSets  SnapShot Encounters  Media :23}   There are no diagnoses linked to this encounter.    There are no Patient Instructions on file for this visit.     {Time Spent (Optional):72911}  No follow-ups on file.    Klaudia Britt MD

## 2023-08-21 ENCOUNTER — OFFICE VISIT (OUTPATIENT)
Dept: FAMILY MEDICINE CLINIC | Facility: CLINIC | Age: 68
End: 2023-08-21
Payer: COMMERCIAL

## 2023-08-21 VITALS
BODY MASS INDEX: 21.06 KG/M2 | HEART RATE: 76 BPM | OXYGEN SATURATION: 97 % | TEMPERATURE: 97.6 F | DIASTOLIC BLOOD PRESSURE: 76 MMHG | WEIGHT: 134.5 LBS | RESPIRATION RATE: 16 BRPM | SYSTOLIC BLOOD PRESSURE: 125 MMHG

## 2023-08-21 DIAGNOSIS — E78.2 MIXED HYPERLIPIDEMIA: ICD-10-CM

## 2023-08-21 DIAGNOSIS — Z00.00 ANNUAL PHYSICAL EXAM: Primary | ICD-10-CM

## 2023-08-21 DIAGNOSIS — R19.5 LOOSE STOOLS: ICD-10-CM

## 2023-08-21 DIAGNOSIS — Z78.0 POSTMENOPAUSAL: ICD-10-CM

## 2023-08-21 DIAGNOSIS — Z11.59 ENCOUNTER FOR HEPATITIS C SCREENING TEST FOR LOW RISK PATIENT: ICD-10-CM

## 2023-08-21 DIAGNOSIS — N93.9 VAGINAL BLEEDING: ICD-10-CM

## 2023-08-21 PROBLEM — N95.1 MENOPAUSAL AND FEMALE CLIMACTERIC STATES: Status: ACTIVE | Noted: 2023-08-21

## 2023-08-21 PROBLEM — E78.5 HYPERLIPIDEMIA: Status: ACTIVE | Noted: 2023-08-21

## 2023-08-21 PROCEDURE — 99387 INIT PM E/M NEW PAT 65+ YRS: CPT | Performed by: FAMILY MEDICINE

## 2023-08-21 RX ORDER — ESTRADIOL 0.05 MG/D
1 PATCH, EXTENDED RELEASE TRANSDERMAL WEEKLY
Qty: 24 PATCH | Refills: 4 | Status: SHIPPED | OUTPATIENT
Start: 2023-08-21 | End: 2023-08-21

## 2023-08-21 RX ORDER — ESTRADIOL 0.05 MG/D
1 PATCH, EXTENDED RELEASE TRANSDERMAL 2 TIMES WEEKLY
Qty: 24 PATCH | Refills: 4 | Status: SHIPPED | OUTPATIENT
Start: 2023-08-21

## 2023-08-21 RX ORDER — ESTRADIOL/NORETHINDRONE ACETATE TRANSDERMAL SYSTEM .05; .25 MG/D; MG/D
1 PATCH, EXTENDED RELEASE TRANSDERMAL 2 TIMES WEEKLY
Qty: 24 PATCH | Refills: 4 | Status: SHIPPED | OUTPATIENT
Start: 2023-08-21

## 2023-08-21 NOTE — PATIENT INSTRUCTIONS
I have ordered lab tests today.  You should receive a phone call or a [employer] message with those results.  If you have not heard from us in 7-10 days, please call the office.      You declined DEXA screening and routine vaccinations.    We discussed routine screening for breast cancer and you declined.  I did remind you that hormone therapy can make breast cancer worse and taking hormones without mammograms increases your risk of premature death and advanced cancer.  I did renew your hormones as they have been prescribed by request.     We also discuss that it's very unusual to have vaginal bleeding just from hormone therapy and that we need to look at your uterus.  There is almost certainly other options for hormone therapy that shouldn't make you bleed.   I know you've been on this regimen since living in [place] but there may be better options.  I also explained that a Pap smear would not be adequate to check your uterus.  You hesitantly agreed to see a gynecologist.

## 2023-08-21 NOTE — PROGRESS NOTES
Chief Complaint  Medicare Wellness-subsequent    Subjective    {CC  Problem List  Visit  Diagnosis   Encounters  Notes  Medications  Labs  Result Review Imaging  Media :23}     Alicia Lan presents to [facility] [place] for Medicare Wellness-subsequent.    History of Present Illness     Last pap smear: 2018 and HPV negative  Last mammogram: patient refuses and understands the risk cancer, clots and premature dealth.  Has never stopped having menstrual cycles and has been on this regimen for many years and is quite satisfied with it.  Started in [place] in her 50s.  DEXA: distant history and hesitant  Last colonoscopy: 2009 and had a Cologuard in 2021  Ever screened for Hepatitis C: no  Vaccines: is due for covid, tdap, shingles and pneumonia but defers today.  She is hesitant for any intervention.  Exercise: regular  Smoking status: former as a teen and young adult  Alcohol use: daily half a bottle with her  in the evening  Sunscreen use: yes but has allergies to some.     Hypertension in the past and is currently off medication.  Was even hospitalized for a hypertensive urgency.  Hyperlipidemia that is quite high historically but will not medicate.  She exercises regularly as the treatment.  Was just on vacation in [place] and ate roy and eggs daily which is unusual.  Travels with her  while he works and their daughter is at home.     Review of Systems   Gastrointestinal:         External hemorrhoids at times and can feel them but no itch or bleeding.     Does get intestinal gas.  Seems to correlate to eating wheat.    Genitourinary:         Some urine leak but doesn't want to schedule physical therapy right now.       Objective       Vital Signs:   /76 (BP Location: Right arm, Patient Position: Sitting, Cuff Size: Adult)   Pulse 76   Temp 97.6 øF (36.4 øC) (Oral)   Resp 16   Wt 61 kg (134 lb 8 oz)   SpO2 97%   BMI 21.06 kg/mý  132    Body mass index is 21.06 kg/mý.        PHQ-9 Total Score: 0     Physical Exam  Constitutional:       General: She is not in acute distress.     Appearance: Normal appearance.   HENT:      Head: Normocephalic and atraumatic.      Nose: Nose normal.   Eyes:      Conjunctiva/sclera: Conjunctivae normal.      Pupils: Pupils are equal, round, and reactive to light.   Cardiovascular:      Rate and Rhythm: Normal rate and regular rhythm.      Heart sounds: Normal heart sounds.   Pulmonary:      Effort: Pulmonary effort is normal.      Breath sounds: Normal breath sounds.   Chest:   Breasts:     Right: Normal.      Left: Normal.   Abdominal:      General: Bowel sounds are normal.      Palpations: Abdomen is soft.   Musculoskeletal:      Cervical back: Neck supple.   Skin:     General: Skin is warm.   Neurological:      General: No focal deficit present.      Mental Status: She is alert.      Gait: Gait normal.   Psychiatric:         Behavior: Behavior normal.        Result Review  Data Reviewed:{ Labs  Result Review  Imaging  Med Tab  Media :23}               Discussed healthy diet, exercise, adequate sleep, cancer screening, immunizations and preventative care. Annual eye exam and routine dental cleaning encouraged.        Assessment and Plan {CC Problem List  Visit Diagnosis  ROS  Review (Popup)  Health Maintenance  Quality  BestPractice  Medications  SmartSets  SnapShot Encounters  Media :23}   Diagnoses and all orders for this visit:    1. Physical (Primary)    2. Mixed hyperlipidemia  -     Comprehensive Metabolic Panel  -     Lipid Panel    3. Hep C screen  -     Hepatitis C Antibody    4. Vaginal bleeding  -     Ambulatory Referral to Gynecology    5. Loose stools  Assessment & Plan:  With a family history of celiac disease.  Seems to correlate with wheat in your diet so we checked a celiac panel and may want to go ahead with a colonoscopy.     Orders:  -     Celiac Disease Panel    6. Postmenopausal  -     DEXA Bone Density Axial;  Future  -     estradiol-norethindrone (CombiPatch) 0.05-0.25 MG/DAY; Place 1 patch on the skin as directed by provider 2 (Two) Times a Week.  Dispense: 24 patch; Refill: 4  -     estradiol (MINIVELLE, VIVELLE-DOT) 0.05 MG/24HR patch; Place 1 patch on the skin as directed by provider 1 (One) Time Per Week.  Dispense: 24 patch; Refill: 4        Patient Instructions   I have ordered lab tests today.  You should receive a phone call or a [employer] message with those results.  If you have not heard from us in 7-10 days, please call the office.      You declined DEXA screening and routine vaccinations.    We discussed routine screening for breast cancer and you declined.  I did remind you that hormone therapy can make breast cancer worse and taking hormones without mammograms increases your risk of premature death and advanced cancer.  I did renew your hormones as they have been prescribed by request.     We also discuss that it's very unusual to have vaginal bleeding just from hormone therapy and that we need to look at your uterus.  There is almost certainly other options for hormone therapy that shouldn't make you bleed.   I know you've been on this regimen since living in [place] but there may be better options.  I also explained that a Pap smear would not be adequate to check your uterus.  You hesitantly agreed to see a gynecologist.       No follow-ups on file.    Klaudia Britt MD

## 2023-08-21 NOTE — ASSESSMENT & PLAN NOTE
With a family history of celiac disease.  Seems to correlate with wheat in your diet so we checked a celiac panel and may want to go ahead with a colonoscopy.

## 2023-08-22 LAB
ALBUMIN SERPL-MCNC: 4.7 G/DL (ref 3.9–4.9)
ALBUMIN/GLOB SERPL: 2.4 {RATIO} (ref 1.2–2.2)
ALP SERPL-CCNC: 78 IU/L (ref 44–121)
ALT SERPL-CCNC: 21 IU/L (ref 0–32)
AST SERPL-CCNC: 22 IU/L (ref 0–40)
BILIRUB SERPL-MCNC: 0.3 MG/DL (ref 0–1.2)
BUN SERPL-MCNC: 13 MG/DL (ref 8–27)
BUN/CREAT SERPL: 17 (ref 12–28)
CALCIUM SERPL-MCNC: 9.6 MG/DL (ref 8.7–10.3)
CHLORIDE SERPL-SCNC: 101 MMOL/L (ref 96–106)
CHOLEST SERPL-MCNC: 213 MG/DL (ref 100–199)
CO2 SERPL-SCNC: 25 MMOL/L (ref 20–29)
CREAT SERPL-MCNC: 0.76 MG/DL (ref 0.57–1)
EGFRCR SERPLBLD CKD-EPI 2021: 84 ML/MIN/1.73
ENDOMYSIUM IGA SER QL: NEGATIVE
GLOBULIN SER CALC-MCNC: 2 G/DL (ref 1.5–4.5)
GLUCOSE SERPL-MCNC: 90 MG/DL (ref 70–99)
HCV IGG SERPL QL IA: NON REACTIVE
HDLC SERPL-MCNC: 54 MG/DL
IGA SERPL-MCNC: 79 MG/DL (ref 87–352)
LDLC SERPL CALC-MCNC: 129 MG/DL (ref 0–99)
POTASSIUM SERPL-SCNC: 4.3 MMOL/L (ref 3.5–5.2)
PROT SERPL-MCNC: 6.7 G/DL (ref 6–8.5)
SODIUM SERPL-SCNC: 139 MMOL/L (ref 134–144)
TRIGL SERPL-MCNC: 168 MG/DL (ref 0–149)
TTG IGA SER-ACNC: <2 U/ML (ref 0–3)
TTG IGG SER-ACNC: <2 U/ML (ref 0–5)
VLDLC SERPL CALC-MCNC: 30 MG/DL (ref 5–40)

## 2023-08-24 ENCOUNTER — TELEPHONE (OUTPATIENT)
Dept: FAMILY MEDICINE CLINIC | Facility: CLINIC | Age: 68
End: 2023-08-24
Payer: COMMERCIAL

## 2023-08-24 NOTE — TELEPHONE ENCOUNTER
[facility]elon [facility] called ([phone]) and said that they needed clarification on the instructions for Estradial Patches. You have it as 1 patch on skin as directed by provider 2x a week. They want clarification.     Ref #: [id number]

## 2023-10-19 ENCOUNTER — TELEPHONE (OUTPATIENT)
Dept: FAMILY MEDICINE CLINIC | Facility: CLINIC | Age: 68
End: 2023-10-19

## 2023-10-19 DIAGNOSIS — Z12.11 SCREENING FOR COLON CANCER: Primary | ICD-10-CM

## 2023-10-19 NOTE — TELEPHONE ENCOUNTER
Caller: Alicia Lan    Relationship: Self    Best call back number:     What orders are you requesting (i.e. lab or imaging):     In what timeframe would the patient need to come in:     Where will you receive your lab/imaging services:    Additional notes: PATIENT IS CALLING IN TO SEE IF DR HELTON WILL PUT IN A ORDER FOR HER TO HAVE A COLONOSCOPY.  SHE SAYS SHE HAS HAD ONE ABOUT 10 YEARS AGO AND WILL GO WHEREVER DR HELTON SUGGEST HER TO GO.

## 2023-11-28 ENCOUNTER — PREP FOR SURGERY (OUTPATIENT)
Dept: OTHER | Facility: HOSPITAL | Age: 68
End: 2023-11-28
Payer: COMMERCIAL

## 2023-11-28 DIAGNOSIS — Z80.0 FAMILY HISTORY OF COLON CANCER: Primary | ICD-10-CM

## 2023-11-28 DIAGNOSIS — Z86.010 HISTORY OF COLON POLYPS: ICD-10-CM

## 2023-12-08 ENCOUNTER — TELEPHONE (OUTPATIENT)
Dept: SURGERY | Facility: CLINIC | Age: 68
End: 2023-12-08

## 2023-12-08 PROBLEM — Z80.0 FAMILY HISTORY OF COLON CANCER: Status: ACTIVE | Noted: 2023-11-28

## 2023-12-08 PROBLEM — Z86.010 HISTORY OF COLON POLYPS: Status: ACTIVE | Noted: 2023-11-28

## 2023-12-08 PROBLEM — Z86.0100 HISTORY OF COLON POLYPS: Status: ACTIVE | Noted: 2023-11-28

## 2023-12-08 NOTE — TELEPHONE ENCOUNTER
Caller: DERICK WARREN    Relationship: SELF    Best call back number: [phone]     What is the best time to reach you: ANYTIME    Who are you requesting to speak with (clinical staff, provider,  specific staff member): SURG/PROC      Do you know the name of the person who called: DERICK    What was the call regarding: PT IS WANTING TO SCHEDULE THE C-SCOPE ORDERED W/ DR. LOREDO AFTER SENDING BACK THE PACKET    Is it okay if the provider responds through MyChart: NO-, PREFERS CALL - OKAY TO LEAVE DETAILED VM

## 2023-12-11 ENCOUNTER — TELEPHONE (OUTPATIENT)
Dept: FAMILY MEDICINE CLINIC | Facility: CLINIC | Age: 68
End: 2023-12-11
Payer: COMMERCIAL

## 2023-12-11 NOTE — TELEPHONE ENCOUNTER
Caller: Alicia Lan    Relationship to patient: Self    Best call back number: [phone]    Patient is needing:     PATIENT IS NEEDING A REFILL OF HER:    estradiol (MINIVELLE, VIVELLE-DOT) 0.05 MG/24HR patch       HOWEVER SHE SAW THAT HER MEDICATION WAS CHANGED TO:  estradiol-norethindrone (CombiPatch) 0.05-0.25 MG/DAY     SHE WOULD LIKE A CALLBACK TO DISCUSS WHY THIS WAS CHANGED AND WHY SHE WASN'T CONSULTED ABOUT IT.

## 2023-12-11 NOTE — TELEPHONE ENCOUNTER
Her medications have not been changed.  She's had a script for the Vivelle-dot and the combipatch both. I don't know if a generic substitution was made at the pharmacy but I haven't changed anything.  Does she need a refill on something in the short term?    I do still want her to see a Gynecologist ongoing and I see that she was going to get herself set up according to the referral in the chart. .

## 2023-12-12 DIAGNOSIS — Z78.0 POSTMENOPAUSAL: ICD-10-CM

## 2023-12-12 RX ORDER — ESTRADIOL/NORETHINDRONE ACETATE TRANSDERMAL SYSTEM .05; .25 MG/D; MG/D
1 PATCH, EXTENDED RELEASE TRANSDERMAL 2 TIMES WEEKLY
Qty: 24 PATCH | Refills: 0 | Status: SHIPPED | OUTPATIENT
Start: 2023-12-14

## 2023-12-12 RX ORDER — ESTRADIOL 0.05 MG/D
1 PATCH, EXTENDED RELEASE TRANSDERMAL 2 TIMES WEEKLY
Qty: 24 PATCH | Refills: 0 | Status: SHIPPED | OUTPATIENT
Start: 2023-12-14

## 2023-12-12 NOTE — TELEPHONE ENCOUNTER
I sent both patches to the pharmacy but I do want to point out that the scripts in the system were for twice a week.  Neither had been three times a week.  So the prescription was not changed here.

## 2023-12-12 NOTE — TELEPHONE ENCOUNTER
She said that her rx was changed from twice a week to 3 times a week. Can you resend the Rx with the correct frequency?

## 2024-01-08 RX ORDER — CARVEDILOL 3.12 MG/1
3.125-6.25 TABLET ORAL 2 TIMES DAILY
Qty: 120 TABLET | Refills: 0 | Status: SHIPPED | OUTPATIENT
Start: 2024-01-08

## 2024-01-08 NOTE — TELEPHONE ENCOUNTER
Rx Refill Note  Requested Prescriptions     Pending Prescriptions Disp Refills    carvedilol (COREG) 3.125 MG tablet [Pharmacy Med Name: Carvedilol 3.125 MG Oral Tablet] 120 tablet 0     Sig: TAKE 1 TO 2 TABLETS BY MOUTH TWICE DAILY      Last office visit with prescribing clinician: 8/21/2023   Last telemedicine visit with prescribing clinician: Visit date not found   Next office visit with prescribing clinician: 8/28/2024       Fariha Durbin  01/08/24, 16:11 EST

## 2024-01-26 ENCOUNTER — OFFICE VISIT (OUTPATIENT)
Dept: FAMILY MEDICINE CLINIC | Facility: CLINIC | Age: 69
End: 2024-01-26
Payer: COMMERCIAL

## 2024-01-26 VITALS
RESPIRATION RATE: 16 BRPM | OXYGEN SATURATION: 97 % | DIASTOLIC BLOOD PRESSURE: 96 MMHG | TEMPERATURE: 98.7 F | HEIGHT: 63 IN | HEART RATE: 77 BPM | WEIGHT: 137 LBS | SYSTOLIC BLOOD PRESSURE: 170 MMHG | BODY MASS INDEX: 24.27 KG/M2

## 2024-01-26 DIAGNOSIS — I16.0 HYPERTENSIVE URGENCY: ICD-10-CM

## 2024-01-26 DIAGNOSIS — R35.0 URINARY FREQUENCY: Primary | ICD-10-CM

## 2024-01-26 LAB
BILIRUB BLD-MCNC: NEGATIVE MG/DL
CLARITY, POC: ABNORMAL
COLOR UR: YELLOW
EXPIRATION DATE: ABNORMAL
GLUCOSE UR STRIP-MCNC: NEGATIVE MG/DL
KETONES UR QL: NEGATIVE
LEUKOCYTE EST, POC: NEGATIVE
Lab: ABNORMAL
NITRITE UR-MCNC: NEGATIVE MG/ML
PH UR: 7 [PH] (ref 5–8)
PROT UR STRIP-MCNC: NEGATIVE MG/DL
RBC # UR STRIP: NEGATIVE /UL
SP GR UR: 1.02 (ref 1–1.03)
UROBILINOGEN UR QL: ABNORMAL

## 2024-01-26 RX ORDER — NITROFURANTOIN 25; 75 MG/1; MG/1
100 CAPSULE ORAL 2 TIMES DAILY
Qty: 14 CAPSULE | Refills: 0 | Status: SHIPPED | OUTPATIENT
Start: 2024-01-26

## 2024-01-26 NOTE — PROGRESS NOTES
"Subjective     Alicia Lan is a 69 y.o. female who presents with   Chief Complaint   Patient presents with    Urinary Tract Infection       Urinary Tract Infection        Recent flu over the holidays and was sick for two weeks.  Recently returned home.   She isn't sick from flu anymore but she feels bad.  She does have a s re throat.  She has chronic sore throat post tonsillectomy and sees Dr. Rodrigues and is scheduled to go back.   Not sure if that's related.    She has intermittent shortness of breath.  It's not a new issues She's had this issue for years.  She generally needs windows open to get fresh air.     Urinary frequency and leakage.   Poor appetite     Her blood pressure is really high today at 198/100 and she has taken carvedilol today and has taken 4.  She has a history of hypertensive urgency and had sporatic issues with blood pressures through the years and cardiac evaluation.  Her blood pressure after over the past couple years has been fine without medication and she simply takes carvedilol as needed.  She has not had to take any in a very long time until today.      During the visit she started feeling better on subsequent blood pressure had improved to 170/96.             Review of Systems     Objective     /96   Pulse 77   Temp 98.7 °F (37.1 °C) (Oral)   Resp 16   Ht 160 cm (63\")   Wt 62.1 kg (137 lb)   SpO2 97%   Breastfeeding No   BMI 24.27 kg/m²     Physical Exam  Constitutional:       Appearance: Normal appearance.   HENT:      Mouth/Throat:      Pharynx: Oropharynx is clear.   Cardiovascular:      Rate and Rhythm: Normal rate and regular rhythm.      Heart sounds: Normal heart sounds.   Pulmonary:      Effort: Pulmonary effort is normal.      Breath sounds: Normal breath sounds.   Lymphadenopathy:      Cervical: No cervical adenopathy.   Neurological:      Mental Status: She is alert.   Psychiatric:         Behavior: Behavior normal.         Procedures     Assessment & Plan "   Diagnoses and all orders for this visit:    1. Urinary frequency (Primary)  -     POCT urinalysis dipstick, automated  -     Urine Culture - Urine, Urine, Clean Catch  -     nitrofurantoin, macrocrystal-monohydrate, (Macrobid) 100 MG capsule; Take 1 capsule by mouth 2 (Two) Times a Day.  Dispense: 14 capsule; Refill: 0    2. Hypertensive urgency  Assessment & Plan:  Improving while in office with blood pressure coming down after carvedilol.           Discussion    Patient Instructions   Since your blood pressure started coming down, appetite is returning and you're feeling better, I am reassured and I think it could be blood pressure related.     Monitor your blood pressure and take up to 6 as you have in the past.  I typically recommend being consistent with your medication and in your circumstance, take carvedilol routinely at least one pill twice a day for the next week and let me know how you're doing.     I did go ahead with an antibiotic while we wait for results.     We may do labs next week if you start feeling worse again.  CBC, CMP, ESR, TSH    Go ahead and keep your follow up with Dr. Rodrigues as planned.               Klaudia Britt MD

## 2024-01-26 NOTE — PATIENT INSTRUCTIONS
Since your blood pressure started coming down, appetite is returning and you're feeling better, I am reassured and I think it could be blood pressure related.     Monitor your blood pressure and take up to 6 as you have in the past.  I typically recommend being consistent with your medication and in your circumstance, take carvedilol routinely at least one pill twice a day for the next week and let me know how you're doing.     I did go ahead with an antibiotic while we wait for results.     We may do labs next week if you start feeling worse again.  CBC, CMP, ESR, TSH    Go ahead and keep your follow up with Dr. Rodrigues as planned.

## 2024-01-28 LAB
BACTERIA UR CULT: NORMAL
BACTERIA UR CULT: NORMAL

## 2024-01-29 ENCOUNTER — TELEPHONE (OUTPATIENT)
Dept: FAMILY MEDICINE CLINIC | Facility: CLINIC | Age: 69
End: 2024-01-29
Payer: COMMERCIAL

## 2024-01-29 ENCOUNTER — TELEPHONE (OUTPATIENT)
Dept: FAMILY MEDICINE CLINIC | Facility: CLINIC | Age: 69
End: 2024-01-29

## 2024-01-29 DIAGNOSIS — R10.84 GENERALIZED ABDOMINAL PAIN: ICD-10-CM

## 2024-01-29 DIAGNOSIS — R35.0 URINARY FREQUENCY: Primary | ICD-10-CM

## 2024-01-29 NOTE — TELEPHONE ENCOUNTER
Pt informed about results. Says she is feeling much better. Does she need to come back for more blood tests?

## 2024-01-29 NOTE — TELEPHONE ENCOUNTER
Caller: Alicia Lan    Relationship: Self    Best call back number: [phone]     What orders are you requesting (i.e. lab or imaging): LABS / STOOL TEST     In what timeframe would the patient need to come in: ANYTIME    Where will you receive your lab/imaging services: IN OFFICE    Additional notes: PLEASE ADVISE

## 2024-01-29 NOTE — TELEPHONE ENCOUNTER
Pt states her blood pressure is still very high, lower abdominal pain. Curious about pap smear and bowel tests for the abdominal pain. Has colonoscopy scheduled in 2 months. Is also wanting a diabetes test as well.

## 2024-01-29 NOTE — TELEPHONE ENCOUNTER
Caller: Alicia Lan    Relationship to patient: Self    Best call back number: [phone]     Chief complaint: PAP SMEAR     Type of visit: IN OFFICE PROCEDURE     Requested date: IF POSSIBLE MAKE IT SAME DAY AS LABS ONCE LABS ARE PLACED FOR HER     If rescheduling, when is the original appointment:     Additional notes:PLEASE ADVISE

## 2024-01-29 NOTE — TELEPHONE ENCOUNTER
Caller: [place] Alicia    Relationship: Self    Best call back number: [phone]     Caller requesting test results:     What test was performed: URINE SAMPLE     When was the test performed: 01/26/24    Where was the test performed: IN OFFICE    Additional notes: PATIENT CALLING WANTING TO KNOW IF THE RESULTS ARE BACK YET

## 2024-01-30 NOTE — TELEPHONE ENCOUNTER
I have ordered  a CT of the abdomen and pelvis or her and some labs to be done at the office.    I had referred her to a Gynecologist at her physical and I see notes that she was going to schedule that herself.  Did she ever do that.  I don't know if these issues could be related to her hormones or not and she needs to follow through with that.    What dose (how many of the 3.125) of Carvedilol is she taking and what are the blood pressures running?

## 2024-01-30 NOTE — TELEPHONE ENCOUNTER
Pt aware, she has a GYN appt scheduled for June    She is taking 3.125 mg of Carvedilol 2 tab daily     Her BP was 130/78 this morning. It's usually 140s/150s

## 2024-01-30 NOTE — TELEPHONE ENCOUNTER
That blood pressure is much better.   Let's see what comes from the CT and labs I've ordered.  Definitely keep the GYN consult for June, I think changing those hormones may be helpful.  They could be part of the problem.  You could try reducing to just the Combipatch would be helpful.

## 2024-02-02 ENCOUNTER — TELEPHONE (OUTPATIENT)
Dept: FAMILY MEDICINE CLINIC | Facility: CLINIC | Age: 69
End: 2024-02-02
Payer: COMMERCIAL

## 2024-02-02 NOTE — TELEPHONE ENCOUNTER
Spoke with patient and she was wanting to do a stool study. I didn't see that you ordered one the other day. Do you feel she needs it?

## 2024-02-05 ENCOUNTER — OFFICE VISIT (OUTPATIENT)
Dept: FAMILY MEDICINE CLINIC | Facility: CLINIC | Age: 69
End: 2024-02-05
Payer: COMMERCIAL

## 2024-02-05 VITALS
HEART RATE: 70 BPM | OXYGEN SATURATION: 97 % | SYSTOLIC BLOOD PRESSURE: 181 MMHG | DIASTOLIC BLOOD PRESSURE: 112 MMHG | WEIGHT: 134 LBS | HEIGHT: 63 IN | BODY MASS INDEX: 23.74 KG/M2

## 2024-02-05 DIAGNOSIS — I10 ESSENTIAL HYPERTENSION: ICD-10-CM

## 2024-02-05 DIAGNOSIS — N95.1 MENOPAUSAL AND FEMALE CLIMACTERIC STATES: ICD-10-CM

## 2024-02-05 DIAGNOSIS — N39.41 URGE INCONTINENCE OF URINE: ICD-10-CM

## 2024-02-05 DIAGNOSIS — R10.9 ABDOMINAL PAIN, LEFT LATERAL: ICD-10-CM

## 2024-02-05 DIAGNOSIS — R19.5 LOOSE STOOLS: Primary | ICD-10-CM

## 2024-02-05 NOTE — PATIENT INSTRUCTIONS
We discussed resuming the Carvedilol regularly and you agreed.    We did your pap today but you still need to see a gynecologist.  I'd like to see your hormone regimen adjusted and you've been on this regimen since age 44 and there has been more research on HRT.  You are having progestion withdrawal periods every month at 69 because of switching from Combipatch to Vivelle every 2 weeks and there are other options.  The pap smear today and the CT scan will be very reassuring but I'm hoping a gynecologist can come up with a better option for your hormones.  I discussed just stopping the plain estrogen and using the combined patch routinely and that would stop the cycles.    Keep your upcoming colonoscopy and CT scan.

## 2024-02-08 ENCOUNTER — OFFICE VISIT (OUTPATIENT)
Age: 69
End: 2024-02-08
Payer: COMMERCIAL

## 2024-02-08 VITALS
HEIGHT: 55 IN | BODY MASS INDEX: 31.24 KG/M2 | SYSTOLIC BLOOD PRESSURE: 170 MMHG | WEIGHT: 135 LBS | OXYGEN SATURATION: 98 % | DIASTOLIC BLOOD PRESSURE: 100 MMHG

## 2024-02-08 DIAGNOSIS — I10 ESSENTIAL HYPERTENSION: Primary | ICD-10-CM

## 2024-02-08 PROCEDURE — 99213 OFFICE O/P EST LOW 20 MIN: CPT | Performed by: NURSE PRACTITIONER

## 2024-02-08 PROCEDURE — 93000 ELECTROCARDIOGRAM COMPLETE: CPT | Performed by: NURSE PRACTITIONER

## 2024-02-08 RX ORDER — CARVEDILOL 3.12 MG/1
3.12 TABLET ORAL 2 TIMES DAILY
Qty: 180 TABLET | Refills: 3 | Status: SHIPPED | OUTPATIENT
Start: 2024-02-08 | End: 2024-02-12 | Stop reason: SINTOL

## 2024-02-08 NOTE — ASSESSMENT & PLAN NOTE
Patient with a known history of hypertensive urgency requiring hospitalization and a Cardene drip.  She was eventually transitioned to amlodipine and carvedilol.  Unfortunately, patient never followed up after hospitalization in 2020 and has been lost to our clinic since that time.  Patient notes that since having influenza in December 2023 her blood pressure has now elevated.  She had self weaned herself off of carvedilol as well as amlodipine until this time.  Over the past 2 to 3 days patient has restarted carvedilol however she is not taking it consistently.  She admits that at times that she is missing doses, she notes that she has not had any medication this morning.  She also notes that a few nights ago she had high blood pressure so she took 2 pills however she became flushed.  She thinks that her flushness was a reaction to taking too much carvedilol, however I think that it is more consistent with her blood pressure being uncontrolled.  Patient was educated on how to properly take carvedilol and that she cannot miss doses.  The medication will not reach steady state in her system to be effective if she is not taking it consistently.  At this point I have recommended that patient take carvedilol 3.125 mg twice daily consistently over the next month.  We will reevaluate her blood pressure in 1 month's time and can make adjustments if needed.  Patient reports that she would like to stay off of amlodipine and she did not like the way it made her feel.

## 2024-02-08 NOTE — PROGRESS NOTES
CARDIOLOGY        Patient Name: Alicia Lan  :1955  Age: 69 y.o.  Primary Cardiologist: Johnny Sawant MD  Encounter Provider:  NATHAN Manning    Date of Service: 24      CHIEF COMPLAINT / REASON FOR OFFICE VISIT     Follow-Up and Hypertension      HISTORY OF PRESENT ILLNESS       HPI  Alicia Lan is a 69 y.o. female who presents today for overdue follow-up.  Patient last evaluated in clinic in 2021.  Patient previously followed with Dr. Sawant.     Pt has a  history significant for hypertensive urgency, family history of CAD, hyperlipidemia with statin intolerance.    Patient family history of coronary disease includes a sister with an MI at the age of 70 with 4 subsequent steps, her father has had a CABG as well as valvular replacement surgery.  Her mother  suddenly from an MI at the age of 74.    Patient originally established care with Dr. Sawant in  in the [facility] for intermittent episodes of chest discomfort.  She was noted to be grossly hypertensive at that time with blood pressure 200/99.  ECG revealed LAFB as well as T wave changes in the septal leads.  The patient was admitted to the hospital from the [facility] and placed on Cardene drip for blood pressure control.  She was ultimately discharged with amlodipine and carvedilol.  Echocardiogram revealed LVEF 60-65% with mild LVH, aortic valve sclerosis and aortic valve insufficiency.  She has also had an exercise stress test which was negative for ischemia.    Patient reports that in 2023, she had the influenza virus, she notes that since that times her BP has been elevated.  Patient states that since her initial contact with us she stopped taking both carvedilol as well as amlodipine.  When her blood pressure began to elevate over the past several days that she is now back on carvedilol.  However, patient admits to me that at times she is only taking 1 pill, and other x 2 pills although she gets  "flushed when she takes 2 pills, and she notes that she has not had any medication at all this morning.  She notes that she did not like the way the amlodipine made her feel.  Patient reports that she would like to stay on minimal medications.    The following portions of the patient's history were reviewed and updated as appropriate: allergies, current medications, past family history, past medical history, past social history, past surgical history and problem list.      VITAL SIGNS     Visit Vitals  /100 (BP Location: Right leg, Patient Position: Sitting)   Ht 63 cm (24.8\")   Wt 61.2 kg (135 lb)   SpO2 98%   .34 kg/m²         Wt Readings from Last 3 Encounters:   02/08/24 61.2 kg (135 lb)   02/05/24 60.8 kg (134 lb)   01/26/24 62.1 kg (137 lb)     Body mass index is 154.34 kg/m².      REVIEW OF SYSTEMS   Review of Systems   Constitutional: Negative for chills, fever, weight gain and weight loss.   Cardiovascular:  Negative for leg swelling.   Respiratory:  Negative for cough, snoring and wheezing.    Hematologic/Lymphatic: Negative for bleeding problem. Does not bruise/bleed easily.   Skin:  Negative for color change.   Musculoskeletal:  Negative for falls, joint pain and myalgias.   Gastrointestinal:  Negative for melena.   Genitourinary:  Negative for hematuria.   Neurological:  Negative for excessive daytime sleepiness.   Psychiatric/Behavioral:  Negative for depression. The patient is not nervous/anxious.            PHYSICAL EXAMINATION     Vitals and nursing note reviewed.   Constitutional:       Appearance: Normal appearance. Well-developed.   Eyes:      Conjunctiva/sclera: Conjunctivae normal.   Neck:      Vascular: No carotid bruit.   Pulmonary:      Breath sounds: Normal breath sounds.   Cardiovascular:      Normal rate. Regular rhythm. Normal S1 with normal intensity. Normal S2 with normal intensity.       Murmurs: There is no murmur.      No gallop.  No click. No rub.   Edema:     " Peripheral edema absent.   Musculoskeletal: Normal range of motion. Skin:     General: Skin is warm and dry.   Neurological:      Mental Status: Alert and oriented to person, place, and time.      GCS: GCS eye subscore is 4. GCS verbal subscore is 5. GCS motor subscore is 6.   Psychiatric:         Speech: Speech normal.         Behavior: Behavior normal.         Thought Content: Thought content normal.         Judgment: Judgment normal.           REVIEWED DATA       ECG 12 Lead    Date/Time: 2/8/2024 2:26 PM  Performed by: Graciela Leslie APRN    Authorized by: Graciela Leslie APRN  Comparison: compared with previous ECG from 3/13/2020  Rhythm: sinus rhythm  Rate: normal  BPM: 67  Conduction: left anterior fascicular block and non-specific intraventricular conduction delay  T inversion: aVR and aVL  QRS axis: left    Clinical impression: abnormal EKG          Cardiac Procedures:  Echocardiogram 3/4/2020.  LVEF 61-65%.  Mild LVH.  Grade 1 diastolic dysfunction.  Normal RV cavity size and systolic function.  Aortic valve sclerosis.  Mild aortic valve regurgitation.  Myocardial perfusion stress test 3/5/2020.  Negative for evidence of ischemia.    Lipid Panel          8/21/2023    00:00   Lipid Panel   Total Cholesterol 213    Triglycerides 168    HDL Cholesterol 54    VLDL Cholesterol 30    LDL Cholesterol  129        Lab Results   Component Value Date     01/30/2024     08/21/2023    K 4.4 01/30/2024    K 4.3 08/21/2023     01/30/2024     08/21/2023    CO2 27.8 01/30/2024    CO2 25 08/21/2023    BUN 14 01/30/2024    BUN 13 08/21/2023    CREATININE 0.83 01/30/2024    CREATININE 0.76 08/21/2023    EGFRIFNONA 73 03/13/2020    EGFRIFNONA 93 03/05/2020    GLUCOSE 87 01/30/2024    GLUCOSE 90 08/21/2023    CALCIUM 9.7 01/30/2024    CALCIUM 9.6 08/21/2023    PROTENTOTREF 6.8 01/30/2024    PROTENTOTREF 6.7 08/21/2023    ALBUMIN 4.8 01/30/2024    ALBUMIN 4.7 08/21/2023    BILITOT 0.4 01/30/2024     BILITOT 0.3 08/21/2023    AST 19 01/30/2024    AST 22 08/21/2023    ALT 24 01/30/2024    ALT 21 08/21/2023     Lab Results   Component Value Date    WBC 5.85 01/30/2024    WBC 5.89 03/13/2020    HGB 14.0 01/30/2024    HGB 13.0 03/13/2020    HCT 42.2 01/30/2024    HCT 37.9 03/13/2020    MCV 93.0 01/30/2024    MCV 90.7 03/13/2020     01/30/2024     03/13/2020     Lab Results   Component Value Date    PROBNP 96.6 03/03/2020     Lab Results   Component Value Date    TROPONINT <0.010 03/13/2020     Lab Results   Component Value Date    TSH 3.760 03/03/2020             ASSESSMENT & PLAN     Diagnoses and all orders for this visit:    1. Essential hypertension (Primary)  Assessment & Plan:  Patient with a known history of hypertensive urgency requiring hospitalization and a Cardene drip.  She was eventually transitioned to amlodipine and carvedilol.  Unfortunately, patient never followed up after hospitalization in 2020 and has been lost to our clinic since that time.  Patient notes that since having influenza in December 2023 her blood pressure has now elevated.  She had self weaned herself off of carvedilol as well as amlodipine until this time.  Over the past 2 to 3 days patient has restarted carvedilol however she is not taking it consistently.  She admits that at times that she is missing doses, she notes that she has not had any medication this morning.  She also notes that a few nights ago she had high blood pressure so she took 2 pills however she became flushed.  She thinks that her flushness was a reaction to taking too much carvedilol, however I think that it is more consistent with her blood pressure being uncontrolled.  Patient was educated on how to properly take carvedilol and that she cannot miss doses.  The medication will not reach steady state in her system to be effective if she is not taking it consistently.  At this point I have recommended that patient take carvedilol 3.125 mg twice  daily consistently over the next month.  We will reevaluate her blood pressure in 1 month's time and can make adjustments if needed.  Patient reports that she would like to stay off of amlodipine and she did not like the way it made her feel.    Orders:  -     ECG 12 Lead    Other orders  -     carvedilol (COREG) 3.125 MG tablet; Take 1 tablet by mouth 2 (Two) Times a Day.  Dispense: 180 tablet; Refill: 3        Return in about 4 weeks (around 3/7/2024) for NATHAN Thomas.    Future Appointments         Provider Department Center    3/7/2024 2:20 PM Graciela Leslie APRN [facility] CARDIOLOGY [place]    3/22/2024 1:30 PM (Arrive by 12:15 PM) [place] [address]    6/3/2024 1:00 PM Jackie Shetty MD [facility] OBGYN [place]    8/28/2024 9:00 AM Klaudia Britt MD [facility] PRIMARY CARE [place]                    MEDICATIONS         Discharge Medications            Accurate as of February 8, 2024  3:09 PM. If you have any questions, ask your nurse or doctor.                Changes to Medications        Instructions Start Date   carvedilol 3.125 MG tablet  Commonly known as: COREG  What changed: how much to take  Changed by: NATHAN Manning   3.125 mg, Oral, 2 Times Daily             Continue These Medications        Instructions Start Date   aspirin 81 MG EC tablet   81 mg, Oral, Daily      CombiPatch 0.05-0.25 MG/DAY  Generic drug: estradiol-norethindrone   1 patch, Transdermal, 2 Times Weekly      estradiol 0.05 MG/24HR patch  Commonly known as: MINIVELLE, VIVELLE-DOT   1 patch, Transdermal, 2 Times Weekly      MULTI COMPLETE PO   Oral      VITAMIN B 12 PO   Oral                   **Dragon Disclaimer:   Much of this encounter note is an electronic transcription/translation of spoken language to printed text. The electronic translation of spoken language may permit erroneous, or at times, nonsensical words or  phrases to be inadvertently transcribed. Although I have reviewed the note for such errors, some may still exist.

## 2024-02-09 LAB
CONV .: NORMAL
CYTOLOGIST CVX/VAG CYTO: NORMAL
CYTOLOGY CVX/VAG DOC CYTO: NORMAL
CYTOLOGY CVX/VAG DOC THIN PREP: NORMAL
DX ICD CODE: NORMAL
HIV 1 & 2 AB SER-IMP: NORMAL
Lab: NORMAL
OTHER STN SPEC: NORMAL
STAT OF ADQ CVX/VAG CYTO-IMP: NORMAL

## 2024-02-12 ENCOUNTER — TELEPHONE (OUTPATIENT)
Dept: FAMILY MEDICINE CLINIC | Facility: CLINIC | Age: 69
End: 2024-02-12
Payer: COMMERCIAL

## 2024-02-12 RX ORDER — NEBIVOLOL 5 MG/1
5 TABLET ORAL DAILY
Qty: 30 TABLET | Refills: 2 | Status: SHIPPED | OUTPATIENT
Start: 2024-02-12

## 2024-02-13 LAB
BACTERIA SPEC CULT: NORMAL
BACTERIA SPEC CULT: NORMAL
CAMPYLOBACTER STL CULT: NORMAL
E COLI SXT STL QL IA: NEGATIVE
O+P SPEC MICRO: NORMAL
O+P STL CONC: NORMAL
SALM + SHIG STL CULT: NORMAL
SPECIMEN STATUS: NORMAL

## 2024-02-14 ENCOUNTER — APPOINTMENT (OUTPATIENT)
Dept: GENERAL RADIOLOGY | Facility: HOSPITAL | Age: 69
End: 2024-02-14
Payer: COMMERCIAL

## 2024-02-14 ENCOUNTER — HOSPITAL ENCOUNTER (EMERGENCY)
Facility: HOSPITAL | Age: 69
Discharge: HOME | End: 2024-02-14
Attending: STUDENT IN AN ORGANIZED HEALTH CARE EDUCATION/TRAINING PROGRAM | Admitting: STUDENT IN AN ORGANIZED HEALTH CARE EDUCATION/TRAINING PROGRAM
Payer: COMMERCIAL

## 2024-02-14 VITALS
RESPIRATION RATE: 18 BRPM | TEMPERATURE: 97 F | DIASTOLIC BLOOD PRESSURE: 89 MMHG | HEART RATE: 59 BPM | OXYGEN SATURATION: 97 % | SYSTOLIC BLOOD PRESSURE: 170 MMHG

## 2024-02-14 DIAGNOSIS — I10 HYPERTENSION, UNSPECIFIED TYPE: Primary | ICD-10-CM

## 2024-02-14 LAB
ALBUMIN SERPL-MCNC: 4.7 G/DL (ref 3.5–5.2)
ALBUMIN/GLOB SERPL: 2 G/DL
ALP SERPL-CCNC: 69 U/L (ref 39–117)
ALT SERPL W P-5'-P-CCNC: 21 U/L (ref 1–33)
ANION GAP SERPL CALCULATED.3IONS-SCNC: 9.7 MMOL/L (ref 5–15)
AST SERPL-CCNC: 20 U/L (ref 1–32)
BASOPHILS # BLD AUTO: 0.03 10*3/MM3 (ref 0–0.2)
BASOPHILS NFR BLD AUTO: 0.4 % (ref 0–1.5)
BILIRUB SERPL-MCNC: 0.5 MG/DL (ref 0–1.2)
BUN SERPL-MCNC: 14 MG/DL (ref 8–23)
BUN/CREAT SERPL: 20.3 (ref 7–25)
CALCIUM SPEC-SCNC: 9.5 MG/DL (ref 8.6–10.5)
CHLORIDE SERPL-SCNC: 103 MMOL/L (ref 98–107)
CO2 SERPL-SCNC: 25.3 MMOL/L (ref 22–29)
CREAT SERPL-MCNC: 0.69 MG/DL (ref 0.57–1)
DEPRECATED RDW RBC AUTO: 41.4 FL (ref 37–54)
EGFRCR SERPLBLD CKD-EPI 2021: 92.9 ML/MIN/1.73
EOSINOPHIL # BLD AUTO: 0.15 10*3/MM3 (ref 0–0.4)
EOSINOPHIL NFR BLD AUTO: 1.9 % (ref 0.3–6.2)
ERYTHROCYTE [DISTWIDTH] IN BLOOD BY AUTOMATED COUNT: 12.3 % (ref 12.3–15.4)
GLOBULIN UR ELPH-MCNC: 2.3 GM/DL
GLUCOSE SERPL-MCNC: 98 MG/DL (ref 65–99)
HCT VFR BLD AUTO: 40.8 % (ref 34–46.6)
HGB BLD-MCNC: 13.7 G/DL (ref 12–15.9)
IMM GRANULOCYTES # BLD AUTO: 0.03 10*3/MM3 (ref 0–0.05)
IMM GRANULOCYTES NFR BLD AUTO: 0.4 % (ref 0–0.5)
LYMPHOCYTES # BLD AUTO: 1.81 10*3/MM3 (ref 0.7–3.1)
LYMPHOCYTES NFR BLD AUTO: 23.4 % (ref 19.6–45.3)
MCH RBC QN AUTO: 31 PG (ref 26.6–33)
MCHC RBC AUTO-ENTMCNC: 33.6 G/DL (ref 31.5–35.7)
MCV RBC AUTO: 92.3 FL (ref 79–97)
MONOCYTES # BLD AUTO: 0.73 10*3/MM3 (ref 0.1–0.9)
MONOCYTES NFR BLD AUTO: 9.5 % (ref 5–12)
NEUTROPHILS NFR BLD AUTO: 4.97 10*3/MM3 (ref 1.7–7)
NEUTROPHILS NFR BLD AUTO: 64.4 % (ref 42.7–76)
NRBC BLD AUTO-RTO: 0 /100 WBC (ref 0–0.2)
PLATELET # BLD AUTO: 215 10*3/MM3 (ref 140–450)
PMV BLD AUTO: 11.4 FL (ref 6–12)
POTASSIUM SERPL-SCNC: 4 MMOL/L (ref 3.5–5.2)
PROT SERPL-MCNC: 7 G/DL (ref 6–8.5)
RBC # BLD AUTO: 4.42 10*6/MM3 (ref 3.77–5.28)
SODIUM SERPL-SCNC: 138 MMOL/L (ref 136–145)
TROPONIN T SERPL HS-MCNC: <6 NG/L
WBC NRBC COR # BLD AUTO: 7.72 10*3/MM3 (ref 3.4–10.8)

## 2024-02-14 PROCEDURE — 80053 COMPREHEN METABOLIC PANEL: CPT | Performed by: STUDENT IN AN ORGANIZED HEALTH CARE EDUCATION/TRAINING PROGRAM

## 2024-02-14 PROCEDURE — 93005 ELECTROCARDIOGRAM TRACING: CPT | Performed by: STUDENT IN AN ORGANIZED HEALTH CARE EDUCATION/TRAINING PROGRAM

## 2024-02-14 PROCEDURE — 99284 EMERGENCY DEPT VISIT MOD MDM: CPT

## 2024-02-14 PROCEDURE — 93010 ELECTROCARDIOGRAM REPORT: CPT | Performed by: INTERNAL MEDICINE

## 2024-02-14 PROCEDURE — 71045 X-RAY EXAM CHEST 1 VIEW: CPT

## 2024-02-14 PROCEDURE — 25010000002 LABETALOL 5 MG/ML SOLUTION: Performed by: STUDENT IN AN ORGANIZED HEALTH CARE EDUCATION/TRAINING PROGRAM

## 2024-02-14 PROCEDURE — 96374 THER/PROPH/DIAG INJ IV PUSH: CPT

## 2024-02-14 PROCEDURE — 84484 ASSAY OF TROPONIN QUANT: CPT | Performed by: STUDENT IN AN ORGANIZED HEALTH CARE EDUCATION/TRAINING PROGRAM

## 2024-02-14 PROCEDURE — 85025 COMPLETE CBC W/AUTO DIFF WBC: CPT | Performed by: STUDENT IN AN ORGANIZED HEALTH CARE EDUCATION/TRAINING PROGRAM

## 2024-02-14 RX ORDER — LABETALOL HYDROCHLORIDE 5 MG/ML
10 INJECTION, SOLUTION INTRAVENOUS ONCE
Status: COMPLETED | OUTPATIENT
Start: 2024-02-14 | End: 2024-02-14

## 2024-02-14 RX ADMIN — LABETALOL HYDROCHLORIDE 10 MG: 5 INJECTION, SOLUTION INTRAVENOUS at 18:34

## 2024-02-14 NOTE — ED PROVIDER NOTES
EMERGENCY DEPARTMENT ENCOUNTER  Room Number:  37/37  PCP: Klaudia Britt MD  Independent Historians: Patient and Family      HPI:  Chief Complaint: had concerns including Hypertension.     Context: Alicia Lan is a 69 y.o. female with a medical history of HTN and HLD who presents to the ED c/o acute hypertension.  Patient states she has been tracking her blood pressure at home for some time and notes that her blood pressure seems to be well-controlled immediately after taking her amlodipine but as it begins to wear off her blood pressure rises.  Patient states that she took a nap today and when she woke up she had kind of an odd feeling that she cannot describe well other than stating she generally did not feel good.  Patient states she may have had some slight tightness in her chest but is not currently experiencing any symptoms.  Patient denies shortness of breath, vision changes, headache.      Review of prior external notes (non-ED) -and- Review of prior external test results outside of this encounter: Office visit with NATHAN Ramírez of cardiology reviewed from 2 8/24 and notable for concerns regarding hypertension.  Plan at that time was for patient to take carvedilol 3.125 mg twice daily    PAST MEDICAL HISTORY  Active Ambulatory Problems     Diagnosis Date Noted    Essential hypertension 03/03/2020    Impingement syndrome of right shoulder 02/03/2020    Mixed hyperlipidemia 08/21/2023    Menopausal and female climacteric states 08/21/2023    Loose stools 08/21/2023    Family history of colon cancer 11/28/2023    History of colon polyps 11/28/2023    Urge incontinence of urine 02/05/2024    Abdominal pain, left lateral 02/05/2024     Resolved Ambulatory Problems     Diagnosis Date Noted    No Resolved Ambulatory Problems     Past Medical History:   Diagnosis Date    Aortic valve sclerosis     Chest pain     Hyperlipidemia     Hypertensive urgency     LAFB (left anterior fascicular block)      Poorly-controlled hypertension          PAST SURGICAL HISTORY  History reviewed. No pertinent surgical history.      FAMILY HISTORY  Family History   Problem Relation Age of Onset    Heart attack Mother     Hypertension Mother     Hyperlipidemia Mother     Heart attack Father     Hypertension Father     Hyperlipidemia Father     Heart disease Father     Heart attack Sister          SOCIAL HISTORY  Social History     Socioeconomic History    Marital status:    Tobacco Use    Smoking status: Never    Smokeless tobacco: Never    Tobacco comments:     Smoked as a teen    Vaping Use    Vaping Use: Never used   Substance and Sexual Activity    Alcohol use: Yes     Alcohol/week: 7.0 standard drinks of alcohol     Types: 7 Glasses of wine per week    Drug use: Never    Sexual activity: Defer         ALLERGIES  Methylchloroisothiazolinone [methylisothiazolinone] and [id number] alkyl acrulate crosspol      REVIEW OF SYSTEMS  Review of Systems  Included in HPI  All systems reviewed and negative except for those discussed in HPI.      PHYSICAL EXAM    I have reviewed the triage vital signs and nursing notes.    ED Triage Vitals   Temp Heart Rate Resp BP SpO2   02/14/24 1709 02/14/24 1709 02/14/24 1709 02/14/24 1719 02/14/24 1709   97 °F (36.1 °C) 78 18 (!) 182/94 97 %      Temp src Heart Rate Source Patient Position BP Location FiO2 (%)   -- -- -- -- --              Physical Exam  GENERAL: alert, no acute distress  SKIN: Warm, dry  HENT: Normocephalic, atraumatic  EYES: no scleral icterus  CV: regular rhythm, regular rate  RESPIRATORY: normal effort, lungs clear  ABDOMEN: soft, nontender, nondistended  MUSCULOSKELETAL: no deformity  NEURO: alert, moves all extremities, follows commands    LAB RESULTS  Recent Results (from the past 24 hour(s))   Comprehensive Metabolic Panel    Collection Time: 02/14/24  5:32 PM    Specimen: Blood   Result Value Ref Range    Glucose 98 65 - 99 mg/dL    BUN 14 8 - 23 mg/dL     Creatinine 0.69 0.57 - 1.00 mg/dL    Sodium 138 136 - 145 mmol/L    Potassium 4.0 3.5 - 5.2 mmol/L    Chloride 103 98 - 107 mmol/L    CO2 25.3 22.0 - 29.0 mmol/L    Calcium 9.5 8.6 - 10.5 mg/dL    Total Protein 7.0 6.0 - 8.5 g/dL    Albumin 4.7 3.5 - 5.2 g/dL    ALT (SGPT) 21 1 - 33 U/L    AST (SGOT) 20 1 - 32 U/L    Alkaline Phosphatase 69 39 - 117 U/L    Total Bilirubin 0.5 0.0 - 1.2 mg/dL    Globulin 2.3 gm/dL    A/G Ratio 2.0 g/dL    BUN/Creatinine Ratio 20.3 7.0 - 25.0    Anion Gap 9.7 5.0 - 15.0 mmol/L    eGFR 92.9 >60.0 mL/min/1.73   Single High Sensitivity Troponin T    Collection Time: 02/14/24  5:32 PM    Specimen: Blood   Result Value Ref Range    HS Troponin T <6 <14 ng/L   CBC Auto Differential    Collection Time: 02/14/24  5:32 PM    Specimen: Blood   Result Value Ref Range    WBC 7.72 3.40 - 10.80 10*3/mm3    RBC 4.42 3.77 - 5.28 10*6/mm3    Hemoglobin 13.7 12.0 - 15.9 g/dL    Hematocrit 40.8 34.0 - 46.6 %    MCV 92.3 79.0 - 97.0 fL    MCH 31.0 26.6 - 33.0 pg    MCHC 33.6 31.5 - 35.7 g/dL    RDW 12.3 12.3 - 15.4 %    RDW-SD 41.4 37.0 - 54.0 fl    MPV 11.4 6.0 - 12.0 fL    Platelets 215 140 - 450 10*3/mm3    Neutrophil % 64.4 42.7 - 76.0 %    Lymphocyte % 23.4 19.6 - 45.3 %    Monocyte % 9.5 5.0 - 12.0 %    Eosinophil % 1.9 0.3 - 6.2 %    Basophil % 0.4 0.0 - 1.5 %    Immature Grans % 0.4 0.0 - 0.5 %    Neutrophils, Absolute 4.97 1.70 - 7.00 10*3/mm3    Lymphocytes, Absolute 1.81 0.70 - 3.10 10*3/mm3    Monocytes, Absolute 0.73 0.10 - 0.90 10*3/mm3    Eosinophils, Absolute 0.15 0.00 - 0.40 10*3/mm3    Basophils, Absolute 0.03 0.00 - 0.20 10*3/mm3    Immature Grans, Absolute 0.03 0.00 - 0.05 10*3/mm3    nRBC 0.0 0.0 - 0.2 /100 WBC   ECG 12 Lead Chest Pain    Collection Time: 02/14/24  5:56 PM   Result Value Ref Range    QT Interval 383 ms    QTC Interval 389 ms         RADIOLOGY  XR Chest 1 View    Result Date: 2/14/2024  XR CHEST 1 VW-2/14/2024  HISTORY: Chest pain.  Heart size is within normal limits.  Lungs appear clear. There is moderate thoracic scoliosis. Small mount of aortic calcification is seen.      1. No acute cardiopulmonary process. 2. Scoliosis   This report was finalized on 2/14/2024 6:11 PM by Dr. Cleve Cabral M.D on Workstation: [id number]         MEDICATIONS GIVEN IN ER  Medications   labetalol (NORMODYNE,TRANDATE) injection 10 mg (10 mg Intravenous Given 2/14/24 9564)         ORDERS PLACED DURING THIS VISIT:  Orders Placed This Encounter   Procedures    XR Chest 1 View    Comprehensive Metabolic Panel    Single High Sensitivity Troponin T    CBC Auto Differential    ECG 12 Lead Chest Pain    CBC & Differential         OUTPATIENT MEDICATION MANAGEMENT:  No current Epic-ordered facility-administered medications on file.     Current Outpatient Medications Ordered in Epic   Medication Sig Dispense Refill    aspirin 81 MG EC tablet Take 1 tablet by mouth Daily.      Cyanocobalamin (VITAMIN B 12 PO) Take  by mouth.      estradiol (MINIVELLE, VIVELLE-DOT) 0.05 MG/24HR patch Place 1 patch on the skin as directed by provider 2 (Two) Times a Week. 24 patch 0    estradiol-norethindrone (CombiPatch) 0.05-0.25 MG/DAY Place 1 patch on the skin as directed by provider 2 (Two) Times a Week. 24 patch 0    Multiple Vitamins-Minerals (MULTI COMPLETE PO) Take  by mouth.      nebivolol (Bystolic) 5 MG tablet Take 1 tablet by mouth Daily. 30 tablet 2       PROGRESS, DATA ANALYSIS, CONSULTS, AND MEDICAL DECISION MAKING  All labs have been independently interpreted by me.  All radiology studies have been reviewed by me. All EKG's have been independently viewed and interpreted by me.  Discussion below represents my analysis of pertinent findings related to patient's condition, differential diagnosis, treatment plan and final disposition.    Differential diagnosis includes but is not limited to asymptomatic hypertension, hypertensive emergency, anxiety.    Clinical Scores:                  ED Course as of 02/14/24  1915   Wed Feb 14, 2024   1800 EKG interpreted by me demonstrates sinus rhythm, rate of 62, no [place]/QT prolongation, no ST elevation [MW]   1808 Chest x-ray interpreted by me and demonstrates no evidence of consolidation [MW]   1808 Laboratory evaluation is overall unremarkable including troponin within normal limits.  Patient noted to be hypertensive to the 190s.  Will give 10 mg IV labetalol and recheck blood pressure.  Counseled patient to take Bystolic and discontinue amlodipine as prescribed by her primary care.  Patient states she is getting ready to travel tomorrow but will initiate the new medication regimen.  I instructed her to follow-up closely with primary care for further evaluation of blood pressure.  I gave patient strict return precautions regarding signs to look for including chest pain, shortness of breath, lightheadedness, changes in mental status or vision. [MW]   1914 Blood pressure noted improved to 170s systolic.  And patient remained asymptomatic. [MW]      ED Course User Index  [MW] Antonio Little MD             AS OF 19:15 EST VITALS:    BP - 170/89  HR - 59  TEMP - 97 °F (36.1 °C)  O2 SATS - 97%    COMPLEXITY OF CARE  Admission was considered but after careful review of the patient's presentation, physical examination, diagnostic results, and response to treatment the patient may be safely discharged with outpatient follow-up.      DIAGNOSIS  Final diagnoses:   Hypertension, unspecified type         DISPOSITION  ED Disposition       ED Disposition   Discharge    Condition   Stable    Comment   --                Please note that portions of this document were completed with a voice recognition program.    Note Disclaimer: At [facility], we believe that sharing information builds trust and better relationships. You are receiving this note because you recently visited [facility]. It is possible you will see health information before a provider has talked with you about it. This kind  of information can be easy to misunderstand. To help you fully understand what it means for your health, we urge you to discuss this note with your provider.         Antonio Little MD  02/14/24 1915

## 2024-02-14 NOTE — DISCHARGE INSTRUCTIONS
Please follow-up with your primary care physician in 1 to 2 weeks for repeat evaluation    Please return to the emergency department with new or worsening symptoms including but not limited to chest pain, shortness of breath, lightheadedness, vision changes, confusion    Please discontinue taking the amlodipine as your primary care doctor recommended and start taking the Bystolic that they had sent to the pharmacy for you.

## 2024-02-15 LAB
QT INTERVAL: 383 MS
QTC INTERVAL: 389 MS

## 2024-02-26 ENCOUNTER — TELEPHONE (OUTPATIENT)
Dept: FAMILY MEDICINE CLINIC | Facility: CLINIC | Age: 69
End: 2024-02-26
Payer: COMMERCIAL

## 2024-02-26 NOTE — TELEPHONE ENCOUNTER
Pt said her BP is worse in the evening, over 140's. She takes the med at night and in the morning readings are good

## 2024-02-26 NOTE — TELEPHONE ENCOUNTER
I could increase her dose to 10mg and see how she responds.  Does she have enough to try taking 2 and see how she responds or should I send in the increased dose now?

## 2024-02-26 NOTE — TELEPHONE ENCOUNTER
Patient says her BP was running high last night, but she did not have any reading to provider. She said the ER  she increase her dosage on her medication when she was seen on 02/14/24. This morning it was running 136/59. Patient is asking for your recommendations. She is leaving at 1:00, please respond as quickly as you can.

## 2024-02-26 NOTE — TELEPHONE ENCOUNTER
The reading provided looks good at 136/59 with Bystolic 5mg.  How high was the blood pressure running last night?.  Based on the information I have here, I would continue the Bystolic at 5mg.

## 2024-03-06 ENCOUNTER — TELEPHONE (OUTPATIENT)
Dept: FAMILY MEDICINE CLINIC | Facility: CLINIC | Age: 69
End: 2024-03-06
Payer: COMMERCIAL

## 2024-03-06 RX ORDER — NEBIVOLOL 10 MG/1
10 TABLET ORAL DAILY
Qty: 30 TABLET | Refills: 2 | Status: SHIPPED | OUTPATIENT
Start: 2024-03-06

## 2024-03-06 NOTE — TELEPHONE ENCOUNTER
Pt states she only has 2 of the Bystolic and needs a new script as she takes 2 at a time. Needs 10 mg of Bystolic sent to [facility].   Please advise

## 2024-03-06 NOTE — TELEPHONE ENCOUNTER
Caller: [place] Alicia    Relationship: Self    Best call back number: [phone]    Who are you requesting to speak with (clinical staff, provider,  specific staff member): DR. HELTON OR MA    What was the call regarding: STATED THAT THEY WERE GIVEN nebivolol (Bystolic) 5 MG tablet AND THEY DO NOT BELIEVE THAT THIS IS HELPING THEM ANY. STATED THAT THEY WANTED TO LET DR. HELTON KNOW TO SEE WHERE THE NEXT STEPS TAKE THEM. STATED THAT THEY WOULD LIKE TO KNOW IF THEY SHOULD GO THROUGH TO A CARDIOLOGIST TO ALSO HELP. PLEASE CALL AND ADVISE     Health Maintenance Due   Topic Date Due   • Shingles Vaccine (1 of 2) 08/26/2013   • Colorectal Cancer Screening-Colonoscopy  07/11/2019   • DTaP/Tdap/Td Vaccine (2 - Td) 01/15/2020   • Depression Screening  04/26/2020       Patient is due for topics listed above, she wishes to proceed with Immunization(s) Dtap/Tdap/Td and Mammogram, but is not proceeding with Immunization(s) Shingles and Colorectal Cancer Screening: Colonoscopy and iFOBT at this time.     Vaccine Information Statement(s) was given today. This has been reviewed, questions answered, and verbal consent given by Patient for injection(s) and administration of Tetanus/Diphtheria (Td) .    Patient tolerated without incident. See immunization grid for documentation.      Over the last 2 weeks, how often have you been bothered by the following problems?          PHQ2 Score:  0  PHQ2 Score Interpretation:  No further screening needed  1. Little interest or pleasure in activity?:  0  2. Feeling down, depressed, or hopeless?:  0     PHQ9 Score:  8  PHQ9 Score Interpretation:  Mild Depression  3. Trouble falling, staying asleep or sleeping all the time?:  3  4. Feeling tired or having little energy?:  2  5. Poor appetite or overeating?:  2  6. Feeling bad about yourself - or that you are a failure or that you have let yourself or your family down?:  1  7. Trouble concentrating on things such as reading a newspaper or watching television?:  0  8. Moving or speaking so slowly that other people could have noticed? Or the opposite - being so fidgety or restless that you were moving around a lot more than usual?:  0  9. Thoughts that you would be better off dead, or of hurting yourself in some way?:  0       Recent PHQ 2/9 Score    PHQ 2:  Date Adult PHQ 2 Score Adult PHQ 2 Interpretation   7/1/2020 0 No further screening needed       PHQ 9:  Date Adult PHQ 9 Score Adult PHQ 9 Interpretation   7/1/2020 8 Mild Depression

## 2024-03-07 NOTE — TELEPHONE ENCOUNTER
I have sent in the 10mg dose of Bystolic for her but the prior message was that she wasn't sure it was helping.  What kind of readings is she seeing?  She should definite follow up with me and/or cardiology.

## 2024-03-08 NOTE — TELEPHONE ENCOUNTER
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  If she needs to see an MD there, they can arrange that at that office.

## 2024-03-08 NOTE — TELEPHONE ENCOUNTER
Caller: Riky Alicia    Relationship: Self    Best call back number: [phone]     What was the call regarding: PATIENT STATED THAT HER MEDICATION nebivolol (Bystolic) 10 MG tablet  IS WORKING FOR HER. PATIENT STATED SHE DOES NOT HAVE A CARDIOLOGIST, DR. CARTER IS NO LONGER DOING THE CARDIOLOGY SIDE OF THE HOSPITAL. SHE WOULD LIKE TO KNOW IF DR HELTON HAS ANY SUGGESTIONS FOR A CARDIOLOGIST. PATIENT STATED SHE IS GOING TO SEE A CARDIOLOGY NURSE ON 03/14/2024 AND IT SHOULD BE IN [facility]. PLEASE ADVISE.

## 2024-03-12 ENCOUNTER — TELEPHONE (OUTPATIENT)
Dept: FAMILY MEDICINE CLINIC | Facility: CLINIC | Age: 69
End: 2024-03-12
Payer: COMMERCIAL

## 2024-03-12 NOTE — TELEPHONE ENCOUNTER
Caller: Alicia Lan    Relationship: Self    Best call back number: [phone] (Mobile)     What is the best time to reach you: ANYTIME, ASAP    Who are you requesting to speak with (clinical staff, provider,  specific staff member): CLINICAL STAFF/ DR HELTON    Do you know the name of the person who called: Alicia Lan    What was the call regarding: PATIENT STATES HER BLOOD PRESSURE /83 AT DINNER TIME LAST NIGHT, PATIENT ALSO STATES SHE STARTED ON THE NEW MEDICATION NEBIVOLOL ON 02/14/2024, HER BLOOD PRESSURE /61 AT 8AM AND SHE STATES SHE IS HAVING HEART BURN AND SORE STOMACH AT NIGHT AND TAKES EQUATE ACID REDUCER COMPLETE FOR THIS AND PATIENT STATES HER BODY IS NOT GETTING USED TO THE MEDICATION BECAUSE HER BLOOD PRESSURE GOES UP AT THE END OF THE DAY    PATIENT IS ASKING WHAT DR HELTON WOULD LIKE HER TO DO ABOUT THIS, PLEASE ADVISE PATIENT REGARDING THIS ASAP    Is it okay if the provider responds through MyChart: PLEASE CALL PATIENT BACK REGARDING THIS ASAP

## 2024-03-12 NOTE — TELEPHONE ENCOUNTER
Would she like to try breaking the Bystolic in half and taking it twice a day to see if it gives her better coverage?

## 2024-03-14 NOTE — TELEPHONE ENCOUNTER
Pt aware, she said the Bystolic is making her nauseous. She wants to know if she can go back on the carvedilol?

## 2024-03-15 RX ORDER — CARVEDILOL 3.12 MG/1
3.125-6.25 TABLET ORAL 2 TIMES DAILY
Qty: 120 TABLET | Refills: 2 | Status: SHIPPED | OUTPATIENT
Start: 2024-03-15

## 2024-03-15 NOTE — TELEPHONE ENCOUNTER
Does she need a renewal or does she have some at home?  If she has some, it's reasonable to do a trial back on carvedilol.

## 2024-03-17 ENCOUNTER — HOSPITAL ENCOUNTER (OUTPATIENT)
Dept: CT IMAGING | Facility: HOSPITAL | Age: 69
Discharge: HOME | End: 2024-03-17
Admitting: FAMILY MEDICINE
Payer: COMMERCIAL

## 2024-03-17 DIAGNOSIS — R10.84 GENERALIZED ABDOMINAL PAIN: ICD-10-CM

## 2024-03-17 DIAGNOSIS — R35.0 URINARY FREQUENCY: ICD-10-CM

## 2024-03-17 LAB — CREAT BLDA-MCNC: 0.7 MG/DL (ref 0.6–1.3)

## 2024-03-17 PROCEDURE — 25510000001 IOPAMIDOL 61 % SOLUTION: Performed by: FAMILY MEDICINE

## 2024-03-17 PROCEDURE — 74177 CT ABD & PELVIS W/CONTRAST: CPT

## 2024-03-17 PROCEDURE — 82565 ASSAY OF CREATININE: CPT

## 2024-03-17 PROCEDURE — 0 DIATRIZOATE MEGLUMINE & SODIUM PER 1 ML: Performed by: FAMILY MEDICINE

## 2024-03-17 RX ADMIN — IOPAMIDOL 85 ML: 612 INJECTION, SOLUTION INTRAVENOUS at 10:44

## 2024-03-17 RX ADMIN — DIATRIZOATE MEGLUMINE AND DIATRIZOATE SODIUM 30 ML: 660; 100 LIQUID ORAL; RECTAL at 10:44

## 2024-03-19 ENCOUNTER — TELEPHONE (OUTPATIENT)
Dept: SURGERY | Facility: CLINIC | Age: 69
End: 2024-03-19
Payer: COMMERCIAL

## 2024-03-19 ENCOUNTER — TELEPHONE (OUTPATIENT)
Dept: FAMILY MEDICINE CLINIC | Facility: CLINIC | Age: 69
End: 2024-03-19
Payer: COMMERCIAL

## 2024-03-19 NOTE — TELEPHONE ENCOUNTER
Patient call to postpone c-scope for tomorrow. She has decided to wait and get her CT results from her PCP and possibly schedule for August.

## 2024-03-19 NOTE — TELEPHONE ENCOUNTER
Caller: Alicia Lan    Relationship: Self    Best call back number: [phone]    What is the best time to reach you: ANYTIME     Who are you requesting to speak with (clinical staff, provider,  specific staff member): DR. HELTON OR MA     Do you know the name of the person who called: PATIENT     What was the call regarding: IF SHE SHOULD GET HER COLONOSCOPY OR NOT, SHE IS SCHEDULED TOMORROW 3/20/24    Is it okay if the provider responds through MyChart: NO

## 2024-03-22 DIAGNOSIS — Z78.0 POSTMENOPAUSAL: ICD-10-CM

## 2024-03-22 RX ORDER — ESTRADIOL 0.05 MG/D
1 PATCH, EXTENDED RELEASE TRANSDERMAL 2 TIMES WEEKLY
Qty: 24 PATCH | Refills: 3 | Status: SHIPPED | OUTPATIENT
Start: 2024-03-25

## 2024-03-22 NOTE — TELEPHONE ENCOUNTER
Rx Refill Note  Requested Prescriptions     Pending Prescriptions Disp Refills    estradiol (MINIVELLE, VIVELLE-DOT) 0.05 MG/24HR patch 24 patch 0     Sig: Place 1 patch on the skin as directed by provider 2 (Two) Times a Week.      Last office visit with prescribing clinician: 2/5/2024   Last telemedicine visit with prescribing clinician: Visit date not found   Next office visit with prescribing clinician: 8/28/2024       Fariha Durbin  03/22/24, 13:03 EDT

## 2024-03-22 NOTE — TELEPHONE ENCOUNTER
Caller: Alicia Lan    Relationship: Self    Best call back number: [phone]     Requested Prescriptions:   Requested Prescriptions     Pending Prescriptions Disp Refills    estradiol (MINIVELLE, VIVELLE-DOT) 0.05 MG/24HR patch 24 patch 0     Sig: Place 1 patch on the skin as directed by provider 2 (Two) Times a Week.        Pharmacy where request should be sent: [facility], [address] [phone] [facility] [phone]      Last office visit with prescribing clinician: 2/5/2024   Last telemedicine visit with prescribing clinician: Visit date not found   Next office visit with prescribing clinician: 8/28/2024     Additional details provided by patient: PATIENT HAS ONE PACKET LEFT. SHE WAS SENT THE COMBIPATCH INSTEAD OF THIS PATCH    Does the patient have less than a 3 day supply:  [x] Yes  [] No    Would you like a call back once the refill request has been completed: [] Yes [] No    If the office needs to give you a call back, can they leave a voicemail: [] Yes [] No    Sara Green Rep   03/22/24 12:08 EDT

## 2024-08-23 ENCOUNTER — OFFICE VISIT (OUTPATIENT)
Dept: FAMILY MEDICINE CLINIC | Facility: CLINIC | Age: 69
End: 2024-08-23
Payer: COMMERCIAL

## 2024-08-23 VITALS
OXYGEN SATURATION: 98 % | HEART RATE: 87 BPM | BODY MASS INDEX: 24.29 KG/M2 | SYSTOLIC BLOOD PRESSURE: 166 MMHG | DIASTOLIC BLOOD PRESSURE: 74 MMHG | WEIGHT: 137.1 LBS | HEIGHT: 63 IN

## 2024-08-23 DIAGNOSIS — I10 ESSENTIAL HYPERTENSION: ICD-10-CM

## 2024-08-23 DIAGNOSIS — N85.2 UTERINE ENLARGEMENT: ICD-10-CM

## 2024-08-23 DIAGNOSIS — R10.2 PELVIC PRESSURE IN FEMALE: Primary | ICD-10-CM

## 2024-08-23 LAB
BILIRUB BLD-MCNC: NEGATIVE MG/DL
CLARITY, POC: CLEAR
COLOR UR: YELLOW
GLUCOSE UR STRIP-MCNC: NEGATIVE MG/DL
KETONES UR QL: NEGATIVE
LEUKOCYTE EST, POC: NEGATIVE
NITRITE UR-MCNC: NEGATIVE MG/ML
PH UR: 7.5 [PH] (ref 5–8)
PROT UR STRIP-MCNC: NEGATIVE MG/DL
RBC # UR STRIP: ABNORMAL /UL
SP GR UR: 1.01 (ref 1–1.03)
UROBILINOGEN UR QL: ABNORMAL

## 2024-08-23 PROCEDURE — 81002 URINALYSIS NONAUTO W/O SCOPE: CPT | Performed by: FAMILY MEDICINE

## 2024-08-23 PROCEDURE — 99214 OFFICE O/P EST MOD 30 MIN: CPT | Performed by: FAMILY MEDICINE

## 2024-08-23 NOTE — PATIENT INSTRUCTIONS
I will try to get your GYN appointment moved up.   There is nothing for me to do except for check a urine culture.  You need to see a gynecologist, I can't fix this for you.   While waiting to get in with the GYN, it may be helpful to just do the Combipatch twice a week continuously instead of the regimen that you are on.  I will defer to the GYN consultation.  If you end up with a hysterectomy, we have to worry about that part. I understand you don't want to change your hormones but I think there is likely a better course.     You may want defer traveling to get a consultation if available.    Resume your Carvedilol and try to take it regularly.

## 2024-08-23 NOTE — PROGRESS NOTES
"Subjective     Alicia Lan is a 69 y.o. female who presents with   Chief Complaint   Patient presents with    Urinary Tract Infection    Abdominal Pain     Lower abdomen pain        History of Present Illness     She is here for on and off lower abdominal pain for the past few months.   She doesn't feel right in her pelvis.  She feels like something may be pressing or falling.  She had a normal pap in February.  CT and colonoscopy and GYN evaluation were scheduled because of pressure.  She did the CT which showed an enlarged uterus with multiple fibroids and referred for GYN follow up which has been rescheduled a couple times and still months away.   She also has not followed through with colonoscopy.  She thinks her symptoms are a UTI and has a 10 day trip starting next week.  She travels with her  and is gone a lot.     She's been on cyclical HRT since menopause and still has withdrawal bleeding.  She doesn't like the way she feels just on combipatch and has a higher dose of estrogen  by adding an estrogen only patch.  She uses Combipatch only for 3 weeks a month.               Review of Systems     Objective     /74   Pulse 87   Ht 160 cm (63\")   Wt 62.2 kg (137 lb 1.6 oz)   SpO2 98%   Breastfeeding No   BMI 24.29 kg/m²     Physical Exam  Constitutional:       Appearance: Normal appearance.   Genitourinary:     General: Normal vulva.      Exam position: Lithotomy position.      Vagina: Normal. No tenderness.      Uterus: Enlarged.       Adnexa:         Right: No tenderness.          Left: No tenderness.     Neurological:      Mental Status: She is alert.   Psychiatric:         Behavior: Behavior normal.         Thought Content: Thought content normal.         Procedures     Assessment & Plan   Diagnoses and all orders for this visit:    1. Pelvic pressure in female (Primary)  -     POC Urinalysis Dipstick  -     Ambulatory Referral to Gynecology  -     Urine Culture - Urine, Urine, Clean " Catch    2. Uterine enlargement  -     Ambulatory Referral to Gynecology    3. Essential hypertension  Assessment & Plan:  Non compliant with Carvedilol and very high today that did come down on repeat.          BMI is within normal parameters. No other follow-up for BMI required.     Discussion    Patient Instructions   I will try to get your GYN appointment moved up.   There is nothing for me to do except for check a urine culture.  While waiting to get in with the GYN, it may be helpful to just do the Combipatch twice a week continuously instead of the regimen that you are on.  I will defer to the GYN consultation.  If you end up with a hysterectomy, we have to worry about that part.     You may want defer traveling to get a consultation if available.    Resume your Carvedilol and try to take it regularly.               Klaudia Britt MD

## 2024-08-25 LAB
BACTERIA UR CULT: NORMAL
BACTERIA UR CULT: NORMAL

## 2024-09-10 RX ORDER — CARVEDILOL 3.12 MG/1
3.125-6.25 TABLET ORAL 2 TIMES DAILY
Qty: 120 TABLET | Refills: 2 | Status: SHIPPED | OUTPATIENT
Start: 2024-09-10 | End: 2024-09-12 | Stop reason: SDUPTHER

## 2024-09-10 NOTE — TELEPHONE ENCOUNTER
Rx Refill Note  Requested Prescriptions     Pending Prescriptions Disp Refills    carvedilol (COREG) 3.125 MG tablet 120 tablet 2     Sig: Take 1-2 tablets by mouth 2 (Two) Times a Day.      Last office visit with prescribing clinician: 8/23/2024   Next office visit with prescribing clinician: 3/28/2025     Grady Moura MA  09/10/24, 09:28 EDT

## 2024-09-12 RX ORDER — CARVEDILOL 3.12 MG/1
3.125-6.25 TABLET ORAL 2 TIMES DAILY
Qty: 120 TABLET | Refills: 2 | Status: SHIPPED | OUTPATIENT
Start: 2024-09-12

## 2024-09-12 NOTE — TELEPHONE ENCOUNTER
Rx Refill Note  Requested Prescriptions     Pending Prescriptions Disp Refills    carvedilol (COREG) 3.125 MG tablet 120 tablet 2     Sig: Take 1-2 tablets by mouth 2 (Two) Times a Day.      Last office visit with prescribing clinician: 8/23/2024   Next office visit with prescribing clinician: 3/28/2025     Grady Moura MA  09/12/24, 10:03 EDT

## 2024-09-24 RX ORDER — CARVEDILOL 3.12 MG/1
3.125-6.25 TABLET ORAL 2 TIMES DAILY
Qty: 120 TABLET | Refills: 2 | OUTPATIENT
Start: 2024-09-24

## 2024-11-11 ENCOUNTER — OFFICE VISIT (OUTPATIENT)
Dept: OBSTETRICS AND GYNECOLOGY | Facility: CLINIC | Age: 69
End: 2024-11-11
Payer: COMMERCIAL

## 2024-11-11 VITALS
WEIGHT: 136.8 LBS | HEIGHT: 63 IN | BODY MASS INDEX: 24.24 KG/M2 | HEART RATE: 61 BPM | SYSTOLIC BLOOD PRESSURE: 182 MMHG | DIASTOLIC BLOOD PRESSURE: 110 MMHG

## 2024-11-11 DIAGNOSIS — N95.0 PMB (POSTMENOPAUSAL BLEEDING): ICD-10-CM

## 2024-11-11 DIAGNOSIS — Z79.890 HORMONE REPLACEMENT THERAPY (HRT): Primary | ICD-10-CM

## 2024-11-11 NOTE — PROGRESS NOTES
"Chief Complaint   Patient presents with    Gynecologic Exam     Pt is here today to establish care, discuss HRT        SUBJECTIVE:     Alicia Lan is a 69 y.o. No obstetric history on file. who presents     On in her 40s at 32   Menopause   -every month 5 days, regular. Never   Doesn't hardly ever have a hot flash.   Cramping.   Stopped for 2 months, went on hormones and they restarted. On 2 patches, estradiol patch for 2 weeks then combined patch for 2 weeks.       Past Medical History:   Diagnosis Date    Aortic valve sclerosis     Chest pain     Hyperlipidemia     Hypertensive urgency     LAFB (left anterior fascicular block)     Poorly-controlled hypertension       History reviewed. No pertinent surgical history.   Social History     Tobacco Use    Smoking status: Never    Smokeless tobacco: Never    Tobacco comments:     Smoked as a teen    Vaping Use    Vaping status: Never Used   Substance Use Topics    Alcohol use: Yes     Alcohol/week: 7.0 standard drinks of alcohol     Types: 7 Glasses of wine per week    Drug use: Never     OB History   No obstetric history on file.        Review of Systems   All other systems reviewed and are negative.      OBJECTIVE:   Vitals:    11/11/24 1046   BP: (!) 182/110   Pulse: 61   Weight: 62.1 kg (136 lb 12.8 oz)   Height: 160 cm (62.99\")        Physical Exam  Vitals reviewed.   Constitutional:       General: She is not in acute distress.  HENT:      Head: Normocephalic and atraumatic.      Right Ear: External ear normal.      Left Ear: External ear normal.   Eyes:      Extraocular Movements: Extraocular movements intact.      Pupils: Pupils are equal, round, and reactive to light.   Pulmonary:      Effort: Pulmonary effort is normal. No respiratory distress.   Musculoskeletal:         General: No swelling. Normal range of motion.      Cervical back: Normal range of motion and neck supple.   Skin:     General: Skin is warm and dry.   Neurological:      General: No focal " deficit present.      Mental Status: She is alert and oriented to person, place, and time.   Psychiatric:         Mood and Affect: Mood normal.         Behavior: Behavior normal.         ASSESSMENT:     ICD-10-CM ICD-9-CM   1. Hormone replacement therapy (HRT)  Z79.890 V07.4   2. PMB (postmenopausal bleeding)  N95.0 627.1       PLAN:   Will obtain FSH and estradiol levels.  Ordered pelvic ultrasound to evaluate PMB.   See below for orders    Orders Placed This Encounter   Procedures    Follicle Stimulating Hormone     Order Specific Question:   Release to patient     Answer:   Routine Release [[id number]]    Estradiol     Order Specific Question:   Release to patient     Answer:   Routine Release [[id number]]      Return in about 5 weeks (around 12/16/2024) for pelvic ultrasound, f/u PMB and HRT .    Jackie Shetty MD

## 2024-11-12 LAB
ESTRADIOL SERPL-MCNC: 41 PG/ML (ref 0–54.7)
FSH SERPL-ACNC: 27.1 MIU/ML (ref 25.8–134.8)

## 2024-11-15 ENCOUNTER — PATIENT MESSAGE (OUTPATIENT)
Dept: OBSTETRICS AND GYNECOLOGY | Facility: CLINIC | Age: 69
End: 2024-11-15
Payer: COMMERCIAL

## 2024-11-15 ENCOUNTER — PATIENT ROUNDING (BHMG ONLY) (OUTPATIENT)
Dept: OBSTETRICS AND GYNECOLOGY | Facility: CLINIC | Age: 69
End: 2024-11-15
Payer: COMMERCIAL

## 2024-11-15 NOTE — PROGRESS NOTES
My chart message has been sent to the patient for PATIENT ROUNDING with [facility].

## 2024-11-26 ENCOUNTER — TELEPHONE (OUTPATIENT)
Dept: FAMILY MEDICINE CLINIC | Facility: CLINIC | Age: 69
End: 2024-11-26
Payer: COMMERCIAL

## 2024-11-26 RX ORDER — CARVEDILOL 3.12 MG/1
3.125-6.25 TABLET ORAL 2 TIMES DAILY
Qty: 480 TABLET | Refills: 0 | Status: SHIPPED | OUTPATIENT
Start: 2024-11-26

## 2024-11-26 RX ORDER — CARVEDILOL 3.12 MG/1
3.125-6.25 TABLET ORAL 2 TIMES DAILY
Qty: 120 TABLET | Refills: 2 | Status: CANCELLED | OUTPATIENT
Start: 2024-11-26

## 2024-11-26 NOTE — TELEPHONE ENCOUNTER
Patient called, She stated that she has been traveling a lot and is not able to get in town to get her prescription for Carvedilol, would like the prescription to be changed to [facility] so she will be able to get the prescription and when she comes back in town so that she will be able to  the prescription with any amount that she wants .

## 2024-12-23 ENCOUNTER — TELEPHONE (OUTPATIENT)
Dept: OBSTETRICS AND GYNECOLOGY | Facility: CLINIC | Age: 69
End: 2024-12-23

## 2024-12-23 NOTE — TELEPHONE ENCOUNTER
Caller: Alicia Lan    Relationship: Self    Best call back number: [phone]    What is the best time to reach you: ANYTIME    Who are you requesting to speak with (clinical staff, provider,  specific staff member): UNK    Do you know the name of the person who called: UNK    What was the call regarding: R/S HER U/S AND GYN F/U FOR LATER IN DAY    Is it okay if the provider responds through MyChart:

## 2025-01-11 NOTE — ED TRIAGE NOTES
"Patient presents to er via private vehicle from home.  Patient reports that she feels flushed and anxious.  \"I think that I may have high blood pressure\"  Reports that she checked blood pressure this morning and it was normal. Has not checked it this afternoon.   " no

## 2025-01-27 ENCOUNTER — OFFICE VISIT (OUTPATIENT)
Dept: OBSTETRICS AND GYNECOLOGY | Facility: CLINIC | Age: 70
End: 2025-01-27
Payer: COMMERCIAL

## 2025-01-27 VITALS
DIASTOLIC BLOOD PRESSURE: 84 MMHG | WEIGHT: 138 LBS | HEART RATE: 61 BPM | HEIGHT: 63 IN | BODY MASS INDEX: 24.45 KG/M2 | SYSTOLIC BLOOD PRESSURE: 190 MMHG

## 2025-01-27 DIAGNOSIS — Z79.890 HORMONE REPLACEMENT THERAPY: ICD-10-CM

## 2025-01-27 DIAGNOSIS — D25.1 INTRAMURAL LEIOMYOMA OF UTERUS: ICD-10-CM

## 2025-01-27 DIAGNOSIS — N95.0 PMB (POSTMENOPAUSAL BLEEDING): Primary | ICD-10-CM

## 2025-01-27 NOTE — PROGRESS NOTES
"Chief Complaint   Patient presents with    Hormone replacement therapy (HRT)     F/u        SUBJECTIVE:     Alicia Lan is a 70 y.o. postmenopausal female who presents for follow up of hormone replacement therapy and postmenopausal bleeding. The patient has been using HRT since her 40s after she went through early menopause. She typically uses a Combipatch for 2 weeks followed by a Vivelle-Dot patch for 2 weeks. This results in her having a \"period\" when she changes patches that last for 5 days every month. She has been experiencing this \"period\" since she has been on HRT. She had a pelvic ultrasound today to evaluate her uterus and endometrial lining given her postmenopausal bleeding.     Past Medical History:   Diagnosis Date    Aortic valve sclerosis     Chest pain     Hyperlipidemia     Hypertensive urgency     LAFB (left anterior fascicular block)     Poorly-controlled hypertension       No past surgical history on file.   Social History     Tobacco Use    Smoking status: Never    Smokeless tobacco: Never    Tobacco comments:     Smoked as a teen    Vaping Use    Vaping status: Never Used   Substance Use Topics    Alcohol use: Yes     Alcohol/week: 7.0 standard drinks of alcohol     Types: 7 Glasses of wine per week    Drug use: Never     OB History   No obstetric history on file.        Review of Systems   Genitourinary:  Positive for vaginal bleeding.       OBJECTIVE:   Vitals:    01/27/25 0909 01/27/25 1005   BP: 175/92 (!) 190/84   Pulse: 72 61   Weight: 62.6 kg (138 lb)    Height: 160 cm (62.99\")         Physical Exam  Vitals reviewed.   Constitutional:       General: She is not in acute distress.  HENT:      Head: Normocephalic and atraumatic.      Right Ear: External ear normal.      Left Ear: External ear normal.   Eyes:      Extraocular Movements: Extraocular movements intact.      Pupils: Pupils are equal, round, and reactive to light.   Pulmonary:      Effort: Pulmonary effort is normal. No " respiratory distress.   Musculoskeletal:         General: No deformity. Normal range of motion.      Cervical back: Normal range of motion and neck supple.   Skin:     General: Skin is warm and dry.   Neurological:      General: No focal deficit present.      Mental Status: She is alert and oriented to person, place, and time.   Psychiatric:         Mood and Affect: Mood normal.         Behavior: Behavior normal.         ASSESSMENT:     ICD-10-CM ICD-9-CM   1. PMB (postmenopausal bleeding)  N95.0 627.1   2. Hormone replacement therapy  Z79.890 V07.4   3. Intramural leiomyoma of uterus  D25.1 218.1       PLAN:   Patient had pelvic ultrasound performed prior to appointment today that noted enlarged, retroverted uterus measuring 10.5 x 7.6 x 8.2 cm with thickened endometrium measuring 1.47 cm, multiple uterine fibroids measuring 3.3 x 3.8 cm and 4.7 x 5.2 cm intramural, ovaries not visualized, and no free fluid in the pelvis. I discussed that given the findings of thickened endometrial lining measuring 1.47 cm in setting of postmenopausal bleeding, endometrial sampling would be recommended to rule out endometrial malignancy or hyperplasia. The patient, however, does not wish to proceed with sampling because she has been having cyclic bleeding with her changing hormone replacement patches. I discussed that without sampling I cannot rule out endometrial malignancy or hyperplasia but I do suspect that it is likely a withdrawal bleed related to her HRT use. She does not again wish to proceed with endometrial sampling at this time. We discussed changes in her bleeding, increased abdominal bloating, early satiety, pelvic or abdominal pain, should prompt her to call and get scheduled for endometrial sampling. She agrees with plan of care and understands that we cannot rule out malignancy or hyperplasia at this time. She will follow up in 1 year with ultrasound and appointment.   Return in about 1 year (around 1/27/2026) for  f/u HRT and PMB .    Jackie Shetty MD

## 2025-01-28 ENCOUNTER — OFFICE VISIT (OUTPATIENT)
Dept: FAMILY MEDICINE CLINIC | Facility: CLINIC | Age: 70
End: 2025-01-28
Payer: COMMERCIAL

## 2025-01-28 VITALS
DIASTOLIC BLOOD PRESSURE: 79 MMHG | BODY MASS INDEX: 24.1 KG/M2 | HEART RATE: 62 BPM | WEIGHT: 136 LBS | HEIGHT: 63 IN | OXYGEN SATURATION: 97 % | SYSTOLIC BLOOD PRESSURE: 173 MMHG

## 2025-01-28 DIAGNOSIS — Z78.0 POSTMENOPAUSE: ICD-10-CM

## 2025-01-28 DIAGNOSIS — Z12.31 ENCOUNTER FOR SCREENING MAMMOGRAM FOR MALIGNANT NEOPLASM OF BREAST: ICD-10-CM

## 2025-01-28 DIAGNOSIS — I10 ESSENTIAL HYPERTENSION: Primary | ICD-10-CM

## 2025-01-28 PROCEDURE — 99214 OFFICE O/P EST MOD 30 MIN: CPT | Performed by: FAMILY MEDICINE

## 2025-01-28 RX ORDER — OLMESARTAN MEDOXOMIL 20 MG/1
20 TABLET ORAL DAILY
Qty: 30 TABLET | Refills: 1 | Status: SHIPPED | OUTPATIENT
Start: 2025-01-28 | End: 2025-02-03 | Stop reason: SDUPTHER

## 2025-01-28 NOTE — PROGRESS NOTES
Chief Complaint  Chief Complaint   Patient presents with    Hypertension       Subjective    History of Present Illness  Alicia Lan is a 70 y.o. female presents to [facility]     History of Present Illness  The patient presents for evaluation of elevated blood pressure.    She has been experiencing elevated blood pressure readings both yesterday and today, which she managed by taking 4 additional doses of carvedilol. She monitors her blood pressure at home using a wrist cuff and reports no associated symptoms such as headaches or vision changes. She maintains an active lifestyle, engaging in daily 30-minute workouts in her home gym, and reports no lightheadedness or chest pain during these sessions. Her exercise tolerance remains unchanged. Her diet consists of 3 meals per day, although she admits to occasional unhealthy food choices. She is not a heavy salt user and typically consumes 1 glass of wine daily. She is due for an ophthalmology appointment. Her current antihypertensive regimen includes carvedilol, with no other medications reported. She has a history of labetalol use and was previously on amlodipine 3 years ago, which was discontinued due to adverse effects. In 2021, she was hospitalized for hypertensive urgency, during which she was initiated on Cardene drip.    She is currently on hormone therapy, which she plans to discontinue due to concerns about its potential contribution to her high blood pressure. She has been on this therapy for 30 years and recently consulted with her gynecologist, Dr. Shetty, regarding postmenopausal bleeding.    She rarely experiences headaches, but when she does, she takes aspirin. She avoids Tylenol due to its propensity to induce nightmares. She reports a high sensitivity to medication side effects.    She has received her influenza and COVID-19 vaccines. She is scheduled for a mammogram next month and has recently completed her colon  "cancer screening. She follows a gluten-free diet, which she reports has significantly reduced her diarrhea episodes.    SOCIAL HISTORY  She drinks 1 glass of wine.    ALLERGIES  The patient has had an allergic reaction to TYLENOL.    MEDICATIONS  Current: Carvedilol, aspirin  Past: Labetalol, amlodipine    IMMUNIZATIONS  She has received her influenza and COVID-19 vaccines.      Objective   Vitals:    01/28/25 0946   BP: 173/79   Pulse: 62   SpO2: 97%   Weight: 61.7 kg (136 lb)   Height: 160 cm (62.99\")        BMI is within normal parameters. No other follow-up for BMI required.     Physical Exam  Vitals reviewed.   Constitutional:       Appearance: Normal appearance.   HENT:      Head: Normocephalic and atraumatic.   Neck:      Vascular: No carotid bruit.   Cardiovascular:      Rate and Rhythm: Normal rate and regular rhythm.      Pulses: Normal pulses.      Heart sounds: No murmur heard.     Comments: Well perfused  Pulmonary:      Effort: Pulmonary effort is normal. No respiratory distress.      Breath sounds: Normal breath sounds. No wheezing.   Abdominal:      General: There is no distension.   Musculoskeletal:         General: Normal range of motion.   Neurological:      Mental Status: She is alert. Mental status is at baseline.          The following data was reviewed by: Francy Armendariz MD on 01/28/2025:  CMP          2/14/2024    17:32 3/17/2024    10:39   CMP   Glucose 98     BUN 14     Creatinine 0.69  0.70    EGFR 92.9     Sodium 138     Potassium 4.0     Chloride 103     Calcium 9.5     Total Protein 7.0     Albumin 4.7     Globulin 2.3     Total Bilirubin 0.5     Alkaline Phosphatase 69     AST (SGOT) 20     ALT (SGPT) 21     Albumin/Globulin Ratio 2.0     BUN/Creatinine Ratio 20.3     Anion Gap 9.7       CBC          2/14/2024    17:32   CBC   WBC 7.72    RBC 4.42    Hemoglobin 13.7    Hematocrit 40.8    MCV 92.3    MCH 31.0    MCHC 33.6    RDW 12.3    Platelets 215      Consultant notes cardiology, " gynecology and Recent hospitalization notes 2021    Assessment and Plan  Alicia Lan is a 70 y.o. female presents to [facility] today    Assessment & Plan  1.  Uncontrolled hypertension  Her blood pressure readings have been consistently elevated, both in the clinic and at home, despite being on carvedilol. The target blood pressure is less than 140/90. She has not had her thyroid levels checked recently, which could potentially contribute to her elevated blood pressure if uncontrolled. Additionally, anemia can also lead to increased blood pressure, particularly in the context of persistent postmenopausal bleeding. She is advised to limit her sodium intake to 1500 mg per day. Structured diets such as the DASH diet and Mediterranean diet are recommended as they have been shown to help manage blood pressure. She is also encouraged to continue her annual ophthalmology visits due to the potential risk of high blood pressure causing damage to the small vessels in her eyes. She is advised to use an arm cuff for more accurate blood pressure readings, ensuring that her feet are flat on the ground, her arms are resting at heart level, and she is checking on bare skin. A prescription for olmesartan 20 mg daily has been added to her medication regimen. Blood work will be conducted today to assess her thyroid function, kidney health, and iron levels.    2. Hormone therapy.  She has been on hormone therapy for 30 years and is considering discontinuing it to see if it is contributing to her high blood pressure. She saw her gynecologist recently, who is aware of her postmenopausal bleeding.    3. Health maintenance.  She is due for her colon cancer screening and has already sent in her form. She is also due for a mammogram and has an appointment with her doctor in March. Orders for the mammogram will be placed to ensure she stays on top of her screening.  She has follow-up scheduled with her PCP  in March.    Follow-up  The patient will follow up in 4 weeks.      Diagnoses and all orders for this visit:    1. Essential hypertension (Primary)  Overview:  Previously rx labetalol, amlodipine- did not like side effects. Currently on Coreg.     Orders:  -     olmesartan (BENICAR) 20 MG tablet; Take 1 tablet by mouth Daily. Indications: High Blood Pressure  Dispense: 30 tablet; Refill: 1  -     CBC & Differential  -     Comprehensive Metabolic Panel  -     Lipid Panel  -     TSH Rfx On Abnormal To Free T4  -     Hemoglobin A1c  -     Ferritin  -     Iron    2. Encounter for screening mammogram for malignant neoplasm of breast  -     Mammo Screening Digital Tomosynthesis Bilateral With CAD; Future    3. Postmenopause  -     DEXA Bone Density Axial; Future        Patient voiced understanding and agreement with plan of care and had no further questions or concerns at this time.   Medical student present during appointment with patient consent; any time patient spent talking to the medical student or being examined by medical student not included in billable time.    Problems addressed:  established problem:  worsening or not responding to treatment  Complexity: labs ordered yes, labs reviewed yes  Risk: prescription drug management    Francy Armendariz MD  Family Medicine  [facility]      Follow Up  Return in about 4 weeks (around 2/25/2025) for Recheck.    Patient Instructions   HTN plan  - Check labs today  - Add olmesartan 20mg to BP regimen  - The symptoms of organ damage from hypertension are very subtle until there is significant damage, so prevention is key through lifestyle modifications: reduce salt intake to less than 1500mg daily (about a teaspoon), increase intake of unprocessed fresh foods, avoid processed sugar and flour, work up to 120 minutes of exercise weekly as tolerated. Consider trying the DASH diet, or Mediterranean diet  - Goal BP: <140/90  - go to the optometrist every year to  monitor for retinopathy  -  Check BP every day with automatic arm cuff. Ensure BP cuff is on bare skin, you are seated with feet flat on ground and legs uncrossed, the arm is at the level of the heart, and you are not talking during the BP measurement. Keep a log of your BP and bring it to your next appointment.     Check out [email or web address]/health-[email or web address]-blood-[email or web address]    Mammogram and DEXA ordered.      Patient or patient representative verbalized consent for the use of Ambient Listening during the visit with  Francy Armendariz MD for chart documentation. 1/28/2025  12:40 EST

## 2025-01-28 NOTE — PATIENT INSTRUCTIONS
HTN plan  - Check labs today  - Add olmesartan 20mg to BP regimen  - The symptoms of organ damage from hypertension are very subtle until there is significant damage, so prevention is key through lifestyle modifications: reduce salt intake to less than 1500mg daily (about a teaspoon), increase intake of unprocessed fresh foods, avoid processed sugar and flour, work up to 120 minutes of exercise weekly as tolerated. Consider trying the DASH diet, or Mediterranean diet  - Goal BP: <140/90  - go to the optometrist every year to monitor for retinopathy  -  Check BP every day with automatic arm cuff. Ensure BP cuff is on bare skin, you are seated with feet flat on ground and legs uncrossed, the arm is at the level of the heart, and you are not talking during the BP measurement. Keep a log of your BP and bring it to your next appointment.     Check out [email or web address]/health-topics/high-blood-[email or web address]    Mammogram and DEXA ordered.

## 2025-01-29 LAB
ALBUMIN SERPL-MCNC: 4.8 G/DL (ref 3.8–4.8)
ALP SERPL-CCNC: 78 IU/L (ref 44–121)
ALT SERPL-CCNC: 20 IU/L (ref 0–32)
AST SERPL-CCNC: 20 IU/L (ref 0–40)
BASOPHILS # BLD AUTO: 0 X10E3/UL (ref 0–0.2)
BASOPHILS NFR BLD AUTO: 0 %
BILIRUB SERPL-MCNC: 0.4 MG/DL (ref 0–1.2)
BUN SERPL-MCNC: 8 MG/DL (ref 8–27)
BUN/CREAT SERPL: 11 (ref 12–28)
CALCIUM SERPL-MCNC: 9.5 MG/DL (ref 8.7–10.3)
CHLORIDE SERPL-SCNC: 100 MMOL/L (ref 96–106)
CHOLEST SERPL-MCNC: 233 MG/DL (ref 100–199)
CO2 SERPL-SCNC: 26 MMOL/L (ref 20–29)
CREAT SERPL-MCNC: 0.74 MG/DL (ref 0.57–1)
EGFRCR SERPLBLD CKD-EPI 2021: 86 ML/MIN/1.73
EOSINOPHIL # BLD AUTO: 0.1 X10E3/UL (ref 0–0.4)
EOSINOPHIL NFR BLD AUTO: 2 %
ERYTHROCYTE [DISTWIDTH] IN BLOOD BY AUTOMATED COUNT: 12.2 % (ref 11.7–15.4)
FERRITIN SERPL-MCNC: 154 NG/ML (ref 15–150)
GLOBULIN SER CALC-MCNC: 1.9 G/DL (ref 1.5–4.5)
GLUCOSE SERPL-MCNC: 107 MG/DL (ref 70–99)
HBA1C MFR BLD: 5.9 % (ref 4.8–5.6)
HCT VFR BLD AUTO: 42.6 % (ref 34–46.6)
HDLC SERPL-MCNC: 56 MG/DL
HGB BLD-MCNC: 14 G/DL (ref 11.1–15.9)
IMM GRANULOCYTES # BLD AUTO: 0 X10E3/UL (ref 0–0.1)
IMM GRANULOCYTES NFR BLD AUTO: 0 %
IRON SERPL-MCNC: 89 UG/DL (ref 27–139)
LDLC SERPL CALC-MCNC: 143 MG/DL (ref 0–99)
LYMPHOCYTES # BLD AUTO: 1.4 X10E3/UL (ref 0.7–3.1)
LYMPHOCYTES NFR BLD AUTO: 26 %
MCH RBC QN AUTO: 31.1 PG (ref 26.6–33)
MCHC RBC AUTO-ENTMCNC: 32.9 G/DL (ref 31.5–35.7)
MCV RBC AUTO: 95 FL (ref 79–97)
MONOCYTES # BLD AUTO: 0.6 X10E3/UL (ref 0.1–0.9)
MONOCYTES NFR BLD AUTO: 11 %
NEUTROPHILS # BLD AUTO: 3.2 X10E3/UL (ref 1.4–7)
NEUTROPHILS NFR BLD AUTO: 61 %
PLATELET # BLD AUTO: 219 X10E3/UL (ref 150–450)
POTASSIUM SERPL-SCNC: 4.4 MMOL/L (ref 3.5–5.2)
PROT SERPL-MCNC: 6.7 G/DL (ref 6–8.5)
RBC # BLD AUTO: 4.5 X10E6/UL (ref 3.77–5.28)
SODIUM SERPL-SCNC: 140 MMOL/L (ref 134–144)
TRIGL SERPL-MCNC: 190 MG/DL (ref 0–149)
TSH SERPL DL<=0.005 MIU/L-ACNC: 2.54 UIU/ML (ref 0.45–4.5)
VLDLC SERPL CALC-MCNC: 34 MG/DL (ref 5–40)
WBC # BLD AUTO: 5.3 X10E3/UL (ref 3.4–10.8)

## 2025-01-31 NOTE — PROGRESS NOTES
Hello!    Here are the results of your most recent labs:    Your iron, ferritin, CBC, Comprehensive Metabolic Panel, and Thyroid Function was all normal. Any flagged abnormalities are not clinically significant because they are all very close to the normal range.     Your vitamin D, Cholesterol, and Hgb A1C was abnormal.     I recommend supplementing with OTC vitamin D 1000 IU daily for 3-6 months, then take 400 IU daily or a prenatal vitamin.     Your cholesterol was elevated.  For diet and lifestyle intervention, I recommend decreasing intake of trans and saturated fats, and red meat.  Increase physical activity as tolerated.  You may try supplementing with omega-3 1-2g daily, berberine 500mg daily, and/or whole flaxseed if desired.    ASCVD risk: The 10-year ASCVD risk score (Jer CORDOVA, et al., 2019) is: 27.4%    Values used to calculate the score:      Age: 70 years      Sex: Female      Is Non- : No      Diabetic: No      Tobacco smoker: No      Systolic Blood Pressure: 173 mmHg      Is BP treated: Yes      HDL Cholesterol: 56 mg/dL      Total Cholesterol: 233 mg/dL      At this time a statin is recommended because your estimated risk of heart attack or stroke in the next 10 years is 27.4%, I don't see one on your med list. Have you been on cholesterol medicine before?     Your A1c was in the prediabetic range; if it goes over 6.5 you will have type 2 diabetes. I recommend decreasing carbohydrate intake to 150-200 g/day, reducing processed food, increasing fruit and vegetable intake, at least 120 minutes of physical activity per week as tolerated, and controlling blood pressure with a goal of less than 120/80.     Please continue your current medications.  Please contact me with any questions.    Thank you!  Dr. Armendariz

## 2025-02-03 ENCOUNTER — OFFICE VISIT (OUTPATIENT)
Dept: FAMILY MEDICINE CLINIC | Facility: CLINIC | Age: 70
End: 2025-02-03
Payer: COMMERCIAL

## 2025-02-03 VITALS
OXYGEN SATURATION: 97 % | DIASTOLIC BLOOD PRESSURE: 76 MMHG | HEIGHT: 63 IN | HEART RATE: 82 BPM | WEIGHT: 137 LBS | BODY MASS INDEX: 24.27 KG/M2 | SYSTOLIC BLOOD PRESSURE: 148 MMHG

## 2025-02-03 DIAGNOSIS — E78.2 MIXED HYPERLIPIDEMIA: ICD-10-CM

## 2025-02-03 DIAGNOSIS — I10 ESSENTIAL HYPERTENSION: Primary | ICD-10-CM

## 2025-02-03 PROCEDURE — 99214 OFFICE O/P EST MOD 30 MIN: CPT | Performed by: FAMILY MEDICINE

## 2025-02-03 RX ORDER — OLMESARTAN MEDOXOMIL 20 MG/1
20 TABLET ORAL DAILY
Qty: 90 TABLET | Refills: 1 | Status: SHIPPED | OUTPATIENT
Start: 2025-02-17

## 2025-02-03 RX ORDER — OLMESARTAN MEDOXOMIL 20 MG/1
20 TABLET ORAL DAILY
Qty: 90 TABLET | Refills: 1 | Status: SHIPPED | OUTPATIENT
Start: 2025-02-24 | End: 2025-02-03

## 2025-02-03 NOTE — PATIENT INSTRUCTIONS
BP headed in right direction! YAY!     HTN plan  - The symptoms of organ damage from hypertension are very subtle until there is significant damage, so prevention is key through lifestyle modifications: reduce salt intake to less than 1500mg daily (about a teaspoon), increase intake of unprocessed fresh foods, avoid processed sugar and flour, work up to 120 minutes of exercise weekly as tolerated. Consider trying the DASH diet, or Mediterranean diet  - Goal BP: <140/90  - go to the optometrist every year to monitor for retinopathy  -  Check BP every day with automatic arm cuff. Ensure BP cuff is on bare skin, you are seated with feet flat on ground and legs uncrossed, the arm is at the level of the heart, and you are not talking during the BP measurement. Keep a log of your BP and bring it to your next appointment.     Check out [email or web address].[email or web address]/en/health-topics/high-blood-pressure      Cholesterol plan:  - Reduce intake of trans and saturated fats and red meat  - Consider trying the Mediterranean diet  - Supplements you can try: omega-3 1-2g daily, berberine 500mg daily, and/or whole flaxseed added tot hings like yogurt, oatmeal, salads, smoothies  - Increase physical activity to 120 minutes a week as tolerated  - Consider crestor since you previously did not feel well on lipitor- discuss with Dr. GUILLAUME    Check out: [email or web address].[email or web address]/health-topics/cholesterol

## 2025-02-03 NOTE — PROGRESS NOTES
"Chief Complaint  Chief Complaint   Patient presents with    Hypertension       Subjective    History of Present Illness  Alicia Lan is a 70 y.o. female presents to [facility] for followup     History of Present Illness  The patient presents for evaluation of blood pressure and cholesterol management.    She reports an improvement in her blood pressure, which had previously been low, necessitating the discontinuation of her antihypertensive medication. She has been monitoring her blood pressure at home and notes that it is almost to normal range- 140s/70s. She admits to consuming a high-salt diet this week, including ham, and expresses a desire to continue with the current medication regimen for an additional week before considering any changes.    She also mentions that she has not been adhering to a healthy diet recently. She has previously been prescribed Lipitor for her elevated cholesterol levels but discontinued its use due to adverse effects. She expresses a desire to allow her body some time to adjust before initiating any new medications.     Supplemental Information  She has completed her scheduled eye examination.    Objective   Vitals:    02/03/25 1327   BP: 148/76   Pulse: 82   SpO2: 97%   Weight: 62.1 kg (137 lb)   Height: 160 cm (62.99\")        BMI is within normal parameters. No other follow-up for BMI required.       Physical Exam  Vitals reviewed.   Constitutional:       Appearance: Normal appearance.   HENT:      Head: Normocephalic and atraumatic.   Cardiovascular:      Comments: Well perfused  Pulmonary:      Effort: Pulmonary effort is normal. No respiratory distress.   Abdominal:      General: There is no distension.   Musculoskeletal:         General: Normal range of motion.   Neurological:      Mental Status: She is alert. Mental status is at baseline.          The following data was reviewed by: Francy Armendariz MD on 02/03/2025:  CMP          2/14/2024    " 17:32 3/17/2024    10:39 1/28/2025    10:24   CMP   Glucose 98   107    BUN 14   8    Creatinine 0.69  0.70  0.74    EGFR 92.9      Sodium 138   140    Potassium 4.0   4.4    Chloride 103   100    Calcium 9.5   9.5    Total Protein   6.7    Total Protein 7.0      Albumin 4.7   4.8    Globulin   1.9    Globulin 2.3      Total Bilirubin 0.5   0.4    Alkaline Phosphatase 69   78    AST (SGOT) 20   20    ALT (SGPT) 21   20    Albumin/Globulin Ratio 2.0      BUN/Creatinine Ratio 20.3   11    Anion Gap 9.7        CBC          2/14/2024    17:32 1/28/2025    10:24   CBC   WBC 7.72  5.3    RBC 4.42  4.50    Hemoglobin 13.7  14.0    Hematocrit 40.8  42.6    MCV 92.3  95    MCH 31.0  31.1    MCHC 33.6  32.9    RDW 12.3  12.2    Platelets 215  219      Lipid Panel          1/28/2025    10:24   Lipid Panel   Total Cholesterol 233    Triglycerides 190    HDL Cholesterol 56    VLDL Cholesterol 34    LDL Cholesterol  143      TSH          1/28/2025    10:24   TSH   TSH 2.540      Most Recent A1C          1/28/2025    10:24   HGBA1C Most Recent   Hemoglobin A1C 5.9    The 10-year ASCVD risk score (Jer CORDOVA, et al., 2019) is: 20.8%    Values used to calculate the score:      Age: 70 years      Sex: Female      Is Non- : No      Diabetic: No      Tobacco smoker: No      Systolic Blood Pressure: 148 mmHg      Is BP treated: Yes      HDL Cholesterol: 56 mg/dL      Total Cholesterol: 233 mg/dL      Assessment and Plan  Alicia Lan is a 70 y.o. female presents to [facility] today for followup    Assessment & Plan  1. Hypertension  Her blood pressure readings have shown significant improvement, although they remain slightly elevated above the target range of 140/90. She has been on olmesartan 20mg for one week. She is advised to continue monitoring her blood pressure at home and to maintain a low-sodium diet, specifically less than 1500 mg per day, and to adhere to the  Mediterranean diet. If her blood pressure does not improve, an increase in the medication dosage will be considered.    2. Hyperlipidemia  Her cholesterol levels are elevated, ASCVD today 20.8% due to improved BP. She has previously experienced adverse effects with Lipitor and does not want to start cholesterol med at this time. She is advised to limit her intake of trans fats and red meat. The potential benefits of omega-3 supplements or fish oil, 1 to 2 g daily, were discussed. She prefers to wait until her appointment with Dr. Mata at the end of March before starting any new cholesterol medication.    Follow-up  The patient will follow up in 4 weeks.    Diagnoses and all orders for this visit:    1. Essential hypertension (Primary)  Overview:  Previously rx labetalol, amlodipine- did not like side effects. Currently on Coreg, benicar    Orders:  -     Discontinue: olmesartan (BENICAR) 20 MG tablet; Take 1 tablet by mouth Daily. Indications: High Blood Pressure  Dispense: 90 tablet; Refill: 1  -     olmesartan (BENICAR) 20 MG tablet; Take 1 tablet by mouth Daily. Indications: High Blood Pressure  Dispense: 90 tablet; Refill: 1    2. Mixed hyperlipidemia        Patient voiced understanding and agreement with plan of care and had no further questions or concerns at this time.     Problems addressed:  established problem: inadequately controlled,, established problem: improved  Complexity: labs reviewed yes  Risk: prescription drug management    Francy Armendariz MD  Family Medicine  [facility]      Follow Up  Return for Next scheduled follow up.    Patient Instructions   BP headed in right direction! YAY!     HTN plan  - The symptoms of organ damage from hypertension are very subtle until there is significant damage, so prevention is key through lifestyle modifications: reduce salt intake to less than 1500mg daily (about a teaspoon), increase intake of unprocessed fresh foods, avoid processed sugar  and flour, work up to 120 minutes of exercise weekly as tolerated. Consider trying the DASH diet, or Mediterranean diet  - Goal BP: <140/90  - go to the optometrist every year to monitor for retinopathy  -  Check BP every day with automatic arm cuff. Ensure BP cuff is on bare skin, you are seated with feet flat on ground and legs uncrossed, the arm is at the level of the heart, and you are not talking during the BP measurement. Keep a log of your BP and bring it to your next appointment.     Check out https://www.heart.org/en/health-topics/high-blood-pressure      Cholesterol plan:  - Reduce intake of trans and saturated fats and red meat  - Consider trying the Mediterranean diet  - Supplements you can try: omega-3 1-2g daily, berberine 500mg daily, and/or whole flaxseed added tot hings like yogurt, oatmeal, salads, smoothies  - Increase physical activity to 120 minutes a week as tolerated  - Consider crestor since you previously did not feel well on lipitor- discuss with Dr. GUILLAUME    Check out: https://www.heart.org/en/health-topics/cholesterol       Patient or patient representative verbalized consent for the use of Ambient Listening during the visit with  Francy Armendariz MD for chart documentation. 2/3/2025  16:23 EST

## 2025-02-04 ENCOUNTER — TELEPHONE (OUTPATIENT)
Dept: FAMILY MEDICINE CLINIC | Facility: CLINIC | Age: 70
End: 2025-02-04
Payer: COMMERCIAL

## 2025-02-04 DIAGNOSIS — Z12.11 SCREENING FOR COLON CANCER: Primary | ICD-10-CM

## 2025-02-04 NOTE — TELEPHONE ENCOUNTER
Caller: Alicia Lan    Relationship to patient: Self    Best call back number:   Telephone Information:   Mobile [phone]         Patient is needing: PATIENT IS WANTING A REFERRAL FOR A COLONOSCOPY. PLEASE GENERATE REFERRAL AND CALL PATIENT ONCE READY

## 2025-03-04 ENCOUNTER — PREP FOR SURGERY (OUTPATIENT)
Dept: OTHER | Facility: HOSPITAL | Age: 70
End: 2025-03-04
Payer: COMMERCIAL

## 2025-03-04 DIAGNOSIS — Z80.0 FAMILY HISTORY OF COLON CANCER: Primary | ICD-10-CM

## 2025-03-28 ENCOUNTER — OFFICE VISIT (OUTPATIENT)
Dept: FAMILY MEDICINE CLINIC | Facility: CLINIC | Age: 70
End: 2025-03-28
Payer: COMMERCIAL

## 2025-03-28 VITALS
HEART RATE: 84 BPM | OXYGEN SATURATION: 95 % | DIASTOLIC BLOOD PRESSURE: 97 MMHG | SYSTOLIC BLOOD PRESSURE: 192 MMHG | WEIGHT: 135.1 LBS | HEIGHT: 63 IN | BODY MASS INDEX: 23.94 KG/M2

## 2025-03-28 DIAGNOSIS — I10 ESSENTIAL HYPERTENSION: ICD-10-CM

## 2025-03-28 DIAGNOSIS — Z12.4 SCREENING FOR CERVICAL CANCER: ICD-10-CM

## 2025-03-28 DIAGNOSIS — Z00.00 ANNUAL PHYSICAL EXAM: Primary | ICD-10-CM

## 2025-03-28 DIAGNOSIS — E78.2 MIXED HYPERLIPIDEMIA: ICD-10-CM

## 2025-03-28 DIAGNOSIS — Z12.31 SCREENING MAMMOGRAM, ENCOUNTER FOR: ICD-10-CM

## 2025-03-28 DIAGNOSIS — R73.9 HYPERGLYCEMIA: ICD-10-CM

## 2025-03-28 DIAGNOSIS — Z78.9 STATIN INTOLERANCE: ICD-10-CM

## 2025-03-28 RX ORDER — CARVEDILOL 3.12 MG/1
9.38 TABLET ORAL 2 TIMES DAILY
Qty: 540 TABLET | Refills: 1 | Status: SHIPPED | OUTPATIENT
Start: 2025-03-28

## 2025-03-28 NOTE — PATIENT INSTRUCTIONS
We decided to increase carvedilol to 3 pills twice a day.     I have ordered lab tests today.  You should receive a phone call or a "[employer] message with those results.  If you have not heard from us in 7-10 days, please call the office.      We had a long discussion that the hormone patches that I give you could be causing cancer, I am not comfortable continuing these patches without investigating this further.  We did a pap smear but I explained that did not evaluate your uterus.  You said you would have the biopsy if that is what it takes to continue the hormones as they are. I would like to see this in a 6 month period.     I shared that there are hormone regimens that do not cause withdrawal bleeding but you are content with this regimen.    We also discussed the need for breast screenings. I don't think we will be successful but I offered to try to get a breast MRI instead of mammogram and you would be willing to try that.   Taking hormones without screenings has risk and while you are willing to take those risks, I am not comfortable with that and we are trying to come up with a compromise.      Spontaneous, unlabored and symmetrical

## 2025-03-28 NOTE — PROGRESS NOTES
"Chief Complaint  Annual Exam    Subjective    {CC  Problem List  Visit  Diagnosis   Encounters  Notes  Medications  Labs  Result Review Imaging  Media :23}     Alicia Lan presents to [facility] for Annual Exam.    History of Present Illness     Annual Exam    Last pap smear: 2/5/2024  Last mammogram: ordered  DEXA: ordered  Last colonoscopy: ordered  Ever screened for Hepatitis C: yes  Vaccines: due pneumonia, shingles and tetanus  Exercise: yes  Smoking status: distant  Alcohol use: 7 in a week    She has been having withdrawal vaginal bleeding with HRT for years and some discomfort led to ultrasound showing a thickened endometrium and fibroids.  She has refused sampling.  She has refused mammogram for years.  She just wants to continue her hormones as they are.  She does not want to deal with the consequences of changing her hormone or do the testing. She saw Dr. Shetty who did recommend an endometrial biopsy.  She wants to address this by doing a pap smear.     She reports that she is happy with her quality of life and does not want to do these screenings    Hypertension on carvedilol 3.125 one in the morning and two in the afternoon and she has been consistent.  She is getting high readings at home and it's high here.    She has been given numerous other medications but has had a hard time with them.  She took olmesartan 20mg  from Dr. Armendariz recently and she reports it did not help.  She had been on labetalol  and amlodipine in the past and did not like the side effects.  She has been able to tolerate higher doses of carvedilol and willing to do that.     Hyperlipidemia and had been on atorvastatin in the past but stopped taking it due to have side effects. She's not willing to try another.      Review of Systems     Objective       Vital Signs:   BP (!) 192/97   Pulse 84   Ht 160 cm (63\")   Wt 61.3 kg (135 lb 1.6 oz)   SpO2 95%   BMI 23.93 kg/m²     Body mass index is 23.93 " kg/m².      PHQ-9 Total Score:      Physical Exam  Constitutional:       General: She is not in acute distress.     Appearance: Normal appearance.   HENT:      Head: Normocephalic and atraumatic.      Nose: Nose normal.   Eyes:      Conjunctiva/sclera: Conjunctivae normal.      Pupils: Pupils are equal, round, and reactive to light.   Cardiovascular:      Rate and Rhythm: Normal rate and regular rhythm.      Heart sounds: Normal heart sounds.   Pulmonary:      Effort: Pulmonary effort is normal.      Breath sounds: Normal breath sounds.   Chest:   Breasts:     Right: Normal.      Left: Normal.   Abdominal:      General: Bowel sounds are normal.      Palpations: Abdomen is soft.   Genitourinary:     General: Normal vulva.      Exam position: Prone.      Vagina: Normal.      Cervix: Normal.      Uterus: Enlarged.    Musculoskeletal:      Cervical back: Neck supple.   Skin:     General: Skin is warm.   Neurological:      General: No focal deficit present.      Mental Status: She is alert.      Gait: Gait normal.   Psychiatric:         Behavior: Behavior normal.          Result Review  Data Reviewed:{ Labs  Result Review  Imaging  Med Tab  Media :23}               Discussed healthy diet, exercise, adequate sleep, cancer screening, immunizations and preventative care. Annual eye exam and routine dental cleaning encouraged.        Assessment and Plan {CC Problem List  Visit Diagnosis  ROS  Review (Popup)  Health Maintenance  Quality  BestPractice  Medications  SmartSets  SnapShot Encounters  Media :23}   Diagnoses and all orders for this visit:    1. Annual physical exam (Primary)    2. Mixed hyperlipidemia  -     Lipid Panel    3. Essential hypertension  -     carvedilol (COREG) 3.125 MG tablet; Take 3 tablets by mouth 2 (Two) Times a Day.  Dispense: 540 tablet; Refill: 1  -     Comprehensive Metabolic Panel  -     CBC & Differential    4. Statin intolerance    5. Screening mammogram, encounter for  -      MRI Breast Bilateral Without Contrast; Future    6. Screening for cervical cancer  -     IGP, Rfx Aptima HPV ASCU    7. Hyperglycemia  -     Hemoglobin A1c      BMI is within normal parameters. No other follow-up for BMI required.       Patient Instructions   We decided to increase carvedilol to 3 pills twice a day.     I have ordered lab tests today.  You should receive a phone call or a [employer] message with those results.  If you have not heard from us in 7-10 days, please call the office.      We had a long discussion that the hormone patches that I give you could be causing cancer, I am not comfortable continuing these patches without investigating this further.  We did a pap smear but I explained that did not evaluate your uterus.  You said you would have the biopsy if that is what it takes to continue the hormones as they are. I would like to see this in a 6 month period.     I shared that there are hormone regimens that do not cause withdrawal bleeding but you are content with this regimen.    We also discussed the need for breast screenings. I don't think we will be successful but I offered to try to get a breast MRI instead of mammogram and you would be willing to try that.   Taking hormones without screenings has risk and while you are willing to take those risks, I am not comfortable with that and we are trying to come up with a compromise.          No follow-ups on file.    Klaudia Britt MD

## 2025-03-29 LAB
ALBUMIN SERPL-MCNC: 4.7 G/DL (ref 3.5–5.2)
ALBUMIN/GLOB SERPL: 2.2 G/DL
ALP SERPL-CCNC: 79 U/L (ref 39–117)
ALT SERPL-CCNC: 22 U/L (ref 1–33)
AST SERPL-CCNC: 23 U/L (ref 1–32)
BASOPHILS # BLD AUTO: 0.02 10*3/MM3 (ref 0–0.2)
BASOPHILS NFR BLD AUTO: 0.4 % (ref 0–1.5)
BILIRUB SERPL-MCNC: 0.7 MG/DL (ref 0–1.2)
BUN SERPL-MCNC: 9 MG/DL (ref 8–23)
BUN/CREAT SERPL: 13.4 (ref 7–25)
CALCIUM SERPL-MCNC: 9.7 MG/DL (ref 8.6–10.5)
CHLORIDE SERPL-SCNC: 100 MMOL/L (ref 98–107)
CHOLEST SERPL-MCNC: 242 MG/DL (ref 0–200)
CO2 SERPL-SCNC: 29.6 MMOL/L (ref 22–29)
CREAT SERPL-MCNC: 0.67 MG/DL (ref 0.57–1)
EGFRCR SERPLBLD CKD-EPI 2021: 93 ML/MIN/1.73
EOSINOPHIL # BLD AUTO: 0.08 10*3/MM3 (ref 0–0.4)
EOSINOPHIL NFR BLD AUTO: 1.6 % (ref 0.3–6.2)
ERYTHROCYTE [DISTWIDTH] IN BLOOD BY AUTOMATED COUNT: 11.9 % (ref 12.3–15.4)
GLOBULIN SER CALC-MCNC: 2.1 GM/DL
GLUCOSE SERPL-MCNC: 98 MG/DL (ref 65–99)
HBA1C MFR BLD: 5.6 % (ref 4.8–5.6)
HCT VFR BLD AUTO: 40.8 % (ref 34–46.6)
HDLC SERPL-MCNC: 49 MG/DL (ref 40–60)
HGB BLD-MCNC: 13.7 G/DL (ref 12–15.9)
IMM GRANULOCYTES # BLD AUTO: 0.01 10*3/MM3 (ref 0–0.05)
IMM GRANULOCYTES NFR BLD AUTO: 0.2 % (ref 0–0.5)
LDLC SERPL CALC-MCNC: 156 MG/DL (ref 0–100)
LYMPHOCYTES # BLD AUTO: 1.13 10*3/MM3 (ref 0.7–3.1)
LYMPHOCYTES NFR BLD AUTO: 23.2 % (ref 19.6–45.3)
MCH RBC QN AUTO: 31 PG (ref 26.6–33)
MCHC RBC AUTO-ENTMCNC: 33.6 G/DL (ref 31.5–35.7)
MCV RBC AUTO: 92.3 FL (ref 79–97)
MONOCYTES # BLD AUTO: 0.85 10*3/MM3 (ref 0.1–0.9)
MONOCYTES NFR BLD AUTO: 17.5 % (ref 5–12)
NEUTROPHILS # BLD AUTO: 2.78 10*3/MM3 (ref 1.7–7)
NEUTROPHILS NFR BLD AUTO: 57.1 % (ref 42.7–76)
NRBC BLD AUTO-RTO: 0 /100 WBC (ref 0–0.2)
PLATELET # BLD AUTO: 221 10*3/MM3 (ref 140–450)
POTASSIUM SERPL-SCNC: 4.3 MMOL/L (ref 3.5–5.2)
PROT SERPL-MCNC: 6.8 G/DL (ref 6–8.5)
RBC # BLD AUTO: 4.42 10*6/MM3 (ref 3.77–5.28)
SODIUM SERPL-SCNC: 139 MMOL/L (ref 136–145)
TRIGL SERPL-MCNC: 202 MG/DL (ref 0–150)
VLDLC SERPL CALC-MCNC: 37 MG/DL (ref 5–40)
WBC # BLD AUTO: 4.87 10*3/MM3 (ref 3.4–10.8)

## 2025-03-31 ENCOUNTER — TELEPHONE (OUTPATIENT)
Dept: FAMILY MEDICINE CLINIC | Facility: CLINIC | Age: 70
End: 2025-03-31
Payer: COMMERCIAL

## 2025-03-31 NOTE — TELEPHONE ENCOUNTER
Caller: Alicia Lan    Relationship to patient: Self    Patient is needing: PATIENT STATES BECAUSE HER CARVEDILOL WAS INCREASED, HER INSURANCE IS REQUIRING A PRIOR AUTHORIZATION.  PATIENT WANTS TO MAKE SURE DR. HELTON IS AWARE.

## 2025-04-01 LAB
CONV .: NORMAL
CYTOLOGIST CVX/VAG CYTO: NORMAL
CYTOLOGY CVX/VAG DOC CYTO: NORMAL
CYTOLOGY CVX/VAG DOC THIN PREP: NORMAL
DX ICD CODE: NORMAL
OTHER STN SPEC: NORMAL
SERVICE CMNT-IMP: NORMAL
STAT OF ADQ CVX/VAG CYTO-IMP: NORMAL

## 2025-04-14 DIAGNOSIS — Z78.0 POSTMENOPAUSAL: ICD-10-CM

## 2025-04-14 RX ORDER — ESTRADIOL/NORETHINDRONE ACETATE TRANSDERMAL SYSTEM .05; .25 MG/D; MG/D
1 PATCH, EXTENDED RELEASE TRANSDERMAL 2 TIMES WEEKLY
Qty: 24 PATCH | Refills: 0 | Status: SHIPPED | OUTPATIENT
Start: 2025-04-14 | End: 2025-04-21 | Stop reason: SDUPTHER

## 2025-04-14 NOTE — TELEPHONE ENCOUNTER
Rx Refill Note  Requested Prescriptions     Pending Prescriptions Disp Refills    estradiol-norethindrone (CombiPatch) 0.05-0.25 MG/DAY 24 patch 0     Sig: Place 1 patch on the skin as directed by provider 2 (Two) Times a Week.      Last office visit with prescribing clinician: 3/28/2025   Next office visit with prescribing clinician: Visit date not found     Graciela Melara MA  04/14/25, 08:50 EDT

## 2025-04-15 ENCOUNTER — PATIENT MESSAGE (OUTPATIENT)
Dept: FAMILY MEDICINE CLINIC | Facility: CLINIC | Age: 70
End: 2025-04-15
Payer: COMMERCIAL

## 2025-04-21 DIAGNOSIS — Z78.0 POSTMENOPAUSAL: ICD-10-CM

## 2025-04-21 RX ORDER — ESTRADIOL/NORETHINDRONE ACETATE TRANSDERMAL SYSTEM .05; .25 MG/D; MG/D
1 PATCH, EXTENDED RELEASE TRANSDERMAL 2 TIMES WEEKLY
Qty: 24 PATCH | Refills: 0 | Status: SHIPPED | OUTPATIENT
Start: 2025-04-21

## 2025-04-21 RX ORDER — ESTRADIOL 0.05 MG/D
1 PATCH, EXTENDED RELEASE TRANSDERMAL 2 TIMES WEEKLY
Qty: 24 PATCH | Refills: 0 | Status: SHIPPED | OUTPATIENT
Start: 2025-04-21

## 2025-04-21 NOTE — TELEPHONE ENCOUNTER
Caller: Alicia Lan    Relationship to patient: Self    Best call back number: [phone]      Patient is needing: SHE IS CLOSE TO BEING OUT

## 2025-04-21 NOTE — TELEPHONE ENCOUNTER
Caller:     [facility] [address] [facility] [phone] [place] [phone] [facility] (Pharmacy) [phone] (Work)       Requested Prescriptions:   Requested Prescriptions     Pending Prescriptions Disp Refills    estradiol-norethindrone (CombiPatch) 0.05-0.25 MG/DAY 24 patch 0     Sig: Place 1 patch on the skin as directed by provider 2 (Two) Times a Week.    estradiol (MINIVELLE, VIVELLE-DOT) 0.05 MG/24HR patch 24 patch 3     Sig: Place 1 patch on the skin as directed by provider 2 (Two) Times a Week.        Pharmacy where request should be sent: [facility]. [address] [phone] [place] [phone] [facility]     Last office visit with prescribing clinician: 3/28/2025   Last telemedicine visit with prescribing clinician: Visit date not found   Next office visit with prescribing clinician: Visit date not found     Additional details provided by patient:     Does the patient have less than a 3 day supply:  [] Yes  [] No    Would you like a call back once the refill request has been completed: [] Yes [] No    If the office needs to give you a call back, can they leave a voicemail: [] Yes [] No    Sara Rodriguez Rep   04/21/25 11:57 EDT

## 2025-04-30 ENCOUNTER — TELEPHONE (OUTPATIENT)
Dept: FAMILY MEDICINE CLINIC | Facility: CLINIC | Age: 70
End: 2025-04-30
Payer: COMMERCIAL

## 2025-04-30 NOTE — TELEPHONE ENCOUNTER
Caller: Alicia Lan    Relationship: Self    Best call back number: [phone]      What was the call regarding: PATIENT CALLING STATES THERE IS A PROBLEM WITH [facility] ON RECEIVING THE PRESCRIPTION FROM  estradiol (MINIVELLE, VIVELLE-DOT) 0.05 MG/24HR patch     PATIENT JUST WANTS TO FOLLOW UP WITH THIS AND SEE IF WE CAN GET THIS TAKEN CARE OF       [facility] - [address] [phone] [facility] [phone] [facility]     OPTION 2   REF# [id number]

## 2025-05-01 ENCOUNTER — TELEPHONE (OUTPATIENT)
Dept: OBSTETRICS AND GYNECOLOGY | Facility: CLINIC | Age: 70
End: 2025-05-01

## 2025-05-01 NOTE — TELEPHONE ENCOUNTER
Caller: Alicia Lan    Relationship: Self    Best call back number: [phone]    What is the best time to reach you: ANY    Who are you requesting to speak with (clinical staff, provider,  specific staff member): CLINICAL       What was the call regarding: PT STATES SHE SAW HER PCP 3/28/25 AND WAS ADVISED SHE NEEDED TO SCHEDULE BIOPSY WITH DR AGUILAR; REQUESTING A CALL BACK TO DISCUSS.

## 2025-05-08 NOTE — TELEPHONE ENCOUNTER
Caller: Alicia Lan     Relationship: Self     Best call back number: [phone]        What was the call regarding: PATIENT CALLING STATES THERE IS A PROBLEM WITH [facility] ON RECEIVING THE PRESCRIPTION FROM  estradiol (MINIVELLE, VIVELLE-DOT) 0.05 MG/24HR patch      PATIENT JUST WANTS TO FOLLOW UP WITH THIS AND SEE IF WE CAN GET THIS TAKEN CARE OF         [facility] [address] [phone] [facility] [phone] [facility]      OPTION 2   REF# [id number]         PATIENT STILL HASN'T GOTTEN PRESCRIPTION THIS HAS BEEN GOING ON FOR 2 WEEKS     PLEASE GIVE CALLBACK ONCE CALLED INTO [facility]

## 2025-05-09 ENCOUNTER — TELEPHONE (OUTPATIENT)
Dept: OBSTETRICS AND GYNECOLOGY | Facility: CLINIC | Age: 70
End: 2025-05-09

## 2025-05-09 ENCOUNTER — PATIENT MESSAGE (OUTPATIENT)
Dept: FAMILY MEDICINE CLINIC | Facility: CLINIC | Age: 70
End: 2025-05-09
Payer: COMMERCIAL

## 2025-05-09 NOTE — TELEPHONE ENCOUNTER
Hub staff attempted to follow warm transfer process and was unsuccessful     Caller: Alicia Lan    Relationship to patient: Self    Best call back number: [phone]    Patient is needing: PATIENT CALLING TO RESCHEDULE HER ENDOMETRIAL BIOPSY THAT IS SCHEDULED WITH DR. AGUILAR ON 06/09/2025.  PATIENT IS REQUESTING TO MOVE IT TO THE NEXT WEEK (06/16-06/20) ANY DAY AND PREFERS A MORNING APPOINTMENT IF AVAILABLE.

## 2025-05-10 DIAGNOSIS — Z78.0 POSTMENOPAUSAL: ICD-10-CM

## 2025-05-10 RX ORDER — ESTRADIOL 0.05 MG/D
1 PATCH, EXTENDED RELEASE TRANSDERMAL 2 TIMES WEEKLY
Qty: 24 PATCH | Refills: 0 | Status: SHIPPED | OUTPATIENT
Start: 2025-05-12

## 2025-06-18 ENCOUNTER — HOSPITAL ENCOUNTER (EMERGENCY)
Facility: HOSPITAL | Age: 70
Discharge: HOME | End: 2025-06-18
Attending: EMERGENCY MEDICINE
Payer: COMMERCIAL

## 2025-06-18 ENCOUNTER — OFFICE VISIT (OUTPATIENT)
Dept: OBSTETRICS AND GYNECOLOGY | Facility: CLINIC | Age: 70
End: 2025-06-18
Payer: COMMERCIAL

## 2025-06-18 ENCOUNTER — APPOINTMENT (OUTPATIENT)
Dept: GENERAL RADIOLOGY | Facility: HOSPITAL | Age: 70
End: 2025-06-18
Payer: COMMERCIAL

## 2025-06-18 VITALS
BODY MASS INDEX: 24.45 KG/M2 | RESPIRATION RATE: 16 BRPM | DIASTOLIC BLOOD PRESSURE: 91 MMHG | OXYGEN SATURATION: 99 % | SYSTOLIC BLOOD PRESSURE: 179 MMHG | HEIGHT: 63 IN | TEMPERATURE: 97.7 F | HEART RATE: 63 BPM | WEIGHT: 138 LBS

## 2025-06-18 VITALS — DIASTOLIC BLOOD PRESSURE: 99 MMHG | WEIGHT: 138 LBS | SYSTOLIC BLOOD PRESSURE: 215 MMHG | BODY MASS INDEX: 24.45 KG/M2

## 2025-06-18 DIAGNOSIS — N95.0 PMB (POSTMENOPAUSAL BLEEDING): Primary | ICD-10-CM

## 2025-06-18 DIAGNOSIS — R39.9 UTI SYMPTOMS: ICD-10-CM

## 2025-06-18 DIAGNOSIS — I10 HYPERTENSION, UNSPECIFIED TYPE: Primary | ICD-10-CM

## 2025-06-18 DIAGNOSIS — I16.0 HYPERTENSIVE URGENCY: ICD-10-CM

## 2025-06-18 LAB
ALBUMIN SERPL-MCNC: 4.4 G/DL (ref 3.5–5.2)
ALBUMIN/GLOB SERPL: 2.1 G/DL
ALP SERPL-CCNC: 73 U/L (ref 39–117)
ALT SERPL W P-5'-P-CCNC: 24 U/L (ref 1–33)
ANION GAP SERPL CALCULATED.3IONS-SCNC: 12.1 MMOL/L (ref 5–15)
AST SERPL-CCNC: 27 U/L (ref 1–32)
B-HCG UR QL: NEGATIVE
BASOPHILS # BLD AUTO: 0.02 10*3/MM3 (ref 0–0.2)
BASOPHILS NFR BLD AUTO: 0.4 % (ref 0–1.5)
BILIRUB BLD-MCNC: NEGATIVE MG/DL
BILIRUB SERPL-MCNC: 0.6 MG/DL (ref 0–1.2)
BUN SERPL-MCNC: 11 MG/DL (ref 8–23)
BUN/CREAT SERPL: 17.2 (ref 7–25)
CALCIUM SPEC-SCNC: 9.8 MG/DL (ref 8.6–10.5)
CHLORIDE SERPL-SCNC: 101 MMOL/L (ref 98–107)
CO2 SERPL-SCNC: 28.9 MMOL/L (ref 22–29)
CREAT SERPL-MCNC: 0.64 MG/DL (ref 0.57–1)
DEPRECATED RDW RBC AUTO: 40.6 FL (ref 37–54)
EGFRCR SERPLBLD CKD-EPI 2021: 94 ML/MIN/1.73
EOSINOPHIL # BLD AUTO: 0.09 10*3/MM3 (ref 0–0.4)
EOSINOPHIL NFR BLD AUTO: 1.6 % (ref 0.3–6.2)
ERYTHROCYTE [DISTWIDTH] IN BLOOD BY AUTOMATED COUNT: 12.2 % (ref 12.3–15.4)
EXPIRATION DATE: NORMAL
GLOBULIN UR ELPH-MCNC: 2.1 GM/DL
GLUCOSE SERPL-MCNC: 102 MG/DL (ref 65–99)
GLUCOSE UR STRIP-MCNC: NEGATIVE MG/DL
HCT VFR BLD AUTO: 37.4 % (ref 34–46.6)
HGB BLD-MCNC: 12.7 G/DL (ref 12–15.9)
IMM GRANULOCYTES # BLD AUTO: 0.02 10*3/MM3 (ref 0–0.05)
IMM GRANULOCYTES NFR BLD AUTO: 0.4 % (ref 0–0.5)
INTERNAL NEGATIVE CONTROL: NORMAL
INTERNAL POSITIVE CONTROL: NORMAL
KETONES UR QL: NEGATIVE
LEUKOCYTE EST, POC: NEGATIVE
LYMPHOCYTES # BLD AUTO: 1.34 10*3/MM3 (ref 0.7–3.1)
LYMPHOCYTES NFR BLD AUTO: 24.5 % (ref 19.6–45.3)
Lab: NORMAL
MCH RBC QN AUTO: 31.1 PG (ref 26.6–33)
MCHC RBC AUTO-ENTMCNC: 34 G/DL (ref 31.5–35.7)
MCV RBC AUTO: 91.7 FL (ref 79–97)
MONOCYTES # BLD AUTO: 0.57 10*3/MM3 (ref 0.1–0.9)
MONOCYTES NFR BLD AUTO: 10.4 % (ref 5–12)
NEUTROPHILS NFR BLD AUTO: 3.43 10*3/MM3 (ref 1.7–7)
NEUTROPHILS NFR BLD AUTO: 62.7 % (ref 42.7–76)
NITRITE UR-MCNC: NEGATIVE MG/ML
NRBC BLD AUTO-RTO: 0 /100 WBC (ref 0–0.2)
PH UR: 3.5 [PH] (ref 5–8)
PLATELET # BLD AUTO: 192 10*3/MM3 (ref 140–450)
PMV BLD AUTO: 10.6 FL (ref 6–12)
POTASSIUM SERPL-SCNC: 3.7 MMOL/L (ref 3.5–5.2)
PROT SERPL-MCNC: 6.5 G/DL (ref 6–8.5)
PROT UR STRIP-MCNC: ABNORMAL MG/DL
RBC # BLD AUTO: 4.08 10*6/MM3 (ref 3.77–5.28)
RBC # UR STRIP: NEGATIVE /UL
SODIUM SERPL-SCNC: 142 MMOL/L (ref 136–145)
SP GR UR: 1.01 (ref 1–1.03)
TROPONIN T SERPL HS-MCNC: 7 NG/L
UROBILINOGEN UR QL: ABNORMAL
WBC NRBC COR # BLD AUTO: 5.47 10*3/MM3 (ref 3.4–10.8)

## 2025-06-18 PROCEDURE — 80053 COMPREHEN METABOLIC PANEL: CPT | Performed by: EMERGENCY MEDICINE

## 2025-06-18 PROCEDURE — 93010 ELECTROCARDIOGRAM REPORT: CPT | Performed by: INTERNAL MEDICINE

## 2025-06-18 PROCEDURE — 99284 EMERGENCY DEPT VISIT MOD MDM: CPT

## 2025-06-18 PROCEDURE — 84484 ASSAY OF TROPONIN QUANT: CPT | Performed by: EMERGENCY MEDICINE

## 2025-06-18 PROCEDURE — 96376 TX/PRO/DX INJ SAME DRUG ADON: CPT

## 2025-06-18 PROCEDURE — 71045 X-RAY EXAM CHEST 1 VIEW: CPT

## 2025-06-18 PROCEDURE — 25010000002 LABETALOL 5 MG/ML SOLUTION: Performed by: EMERGENCY MEDICINE

## 2025-06-18 PROCEDURE — 85025 COMPLETE CBC W/AUTO DIFF WBC: CPT | Performed by: EMERGENCY MEDICINE

## 2025-06-18 PROCEDURE — 96374 THER/PROPH/DIAG INJ IV PUSH: CPT

## 2025-06-18 PROCEDURE — 93005 ELECTROCARDIOGRAM TRACING: CPT | Performed by: EMERGENCY MEDICINE

## 2025-06-18 RX ORDER — LABETALOL HYDROCHLORIDE 5 MG/ML
10 INJECTION, SOLUTION INTRAVENOUS ONCE AS NEEDED
Status: DISCONTINUED | OUTPATIENT
Start: 2025-06-18 | End: 2025-06-18

## 2025-06-18 RX ORDER — LABETALOL HYDROCHLORIDE 5 MG/ML
10 INJECTION, SOLUTION INTRAVENOUS ONCE
Status: COMPLETED | OUTPATIENT
Start: 2025-06-18 | End: 2025-06-18

## 2025-06-18 RX ADMIN — LABETALOL HYDROCHLORIDE 10 MG: 5 INJECTION, SOLUTION INTRAVENOUS at 18:40

## 2025-06-18 RX ADMIN — LABETALOL HYDROCHLORIDE 10 MG: 5 INJECTION, SOLUTION INTRAVENOUS at 17:55

## 2025-06-18 NOTE — PROGRESS NOTES
Endometrial Biopsy Procedure Note    Pre-operative Diagnosis: Postmenopausal bleeding     Post-operative Diagnosis: Same    Indications: The patient is having cyclic monthly postmenopausal bleeding with hormone therapy. She takes Estradiol patch the first 2 weeks and then changes to a combined hormonal patch with estradiol/progestin the last 2 weeks.     Procedure Details   The risks (including infection, bleeding, pain, and uterine perforation) and benefits of the procedure were explained to the patient and Written informed consent was obtained.  Antibiotic prophylaxis against endocarditis was not indicated.     A timeout procedure was performed.  The patient was placed in the dorsal lithotomy position.  Bimanual exam showed the uterus to be in the retroverted position.  A Buzz speculum was inserted in the vagina, and the cervix prepped with povidone iodine.      A Pipelle endometrial aspirator was used to sample the endometrium.  Based on the markings on the pipelle, the uterus sounded to 9 cm.  Moderate amount of sample was obtained and sent for pathologic examination.    Condition:  Stable    Complications:  None    Plan:  Patient tolerated the procedure well.  The patient was advised to call for any fever or for prolonged or severe pain or bleeding. She was advised to use OTC ibuprofen as needed for mild to moderate pain. She was advised to avoid vaginal intercourse for 48 hours or until the bleeding has completely stopped.  The patient has significantly elevated blood pressure today of 215/99. She reports knowing that her blood pressure is high and reports that she took her Carvedilol this morning. She reports that her PCP told her she can take an additional 3 tablets of the medication if her blood pressure is elevated. She took 3 tablets in the office. I recommended that she go to the ED for hypertensive urgency now but she would like to see her medication works. If it does not, she will go to the ED if  her blood pressure does not come down. We discussed that her blood pressure could lead to a stroke. She indicated understanding. Advised to contact PCP to make adjustments to antihypertensive medications.   She has possible UTI symptoms and her UA returned within normal limits today. Suspect no urinary tract infection at this time and advised patient to contact if having worsening UTI symptoms.     Jackie Shetty MD

## 2025-06-18 NOTE — ED PROVIDER NOTES
EMERGENCY DEPARTMENT ENCOUNTER  Room Number:  17/17  Date of encounter:  6/19/2025  PCP: Klaudia Britt MD  Patient Care Team:  Klaudia Britt MD as PCP - General (Family Medicine)  lKaudia Britt MD (Family Medicine)     HPI:  Context: Alicia Lan is a 70 y.o. female who presents to the ED c/o chief complaint of hypertension.  Patient reports history of hypertension, is on carvedilol, reports medication was increased approximately 2 months ago by primary care secondary to high blood pressure.  Patient reports blood pressure continues to be elevated.  Patient denies any headache at present, does report occasionally she gets headache and feels like she might get a headache later because she has not drank much water today and is usually when headaches occur.  Patient denies any chest pain, no shortness of breath, no unexplained swelling.    MEDICAL HISTORY REVIEW  Reviewed in EPIC    PAST MEDICAL HISTORY  Active Ambulatory Problems     Diagnosis Date Noted    Essential hypertension 03/03/2020    Impingement syndrome of right shoulder 02/03/2020    Mixed hyperlipidemia 08/21/2023    Menopausal and female climacteric states 08/21/2023    Loose stools 08/21/2023    Family history of colon cancer 11/28/2023    History of colon polyps 11/28/2023    Urge incontinence of urine 02/05/2024    Abdominal pain, left lateral 02/05/2024    Uterine enlargement 08/23/2024    Statin intolerance 03/28/2025     Resolved Ambulatory Problems     Diagnosis Date Noted    No Resolved Ambulatory Problems     Past Medical History:   Diagnosis Date    Aortic valve sclerosis     Chest pain     Hyperlipidemia     Hypertensive urgency     LAFB (left anterior fascicular block)     Poorly-controlled hypertension        PAST SURGICAL HISTORY  Past Surgical History:   Procedure Laterality Date    CERVICAL BIOPSY  06/18/2025    in the womb       FAMILY HISTORY  Family History   Problem Relation Age of Onset    Heart  attack Mother     Hypertension Mother     Hyperlipidemia Mother     Heart attack Father     Hypertension Father     Hyperlipidemia Father     Heart disease Father     Heart attack Sister        SOCIAL HISTORY  Social History     Socioeconomic History    Marital status:    Tobacco Use    Smoking status: Never    Smokeless tobacco: Never    Tobacco comments:     Smoked as a teen    Vaping Use    Vaping status: Never Used   Substance and Sexual Activity    Alcohol use: Yes     Alcohol/week: 7.0 standard drinks of alcohol     Types: 7 Glasses of wine per week    Drug use: Never    Sexual activity: Defer       ALLERGIES  Methylchloroisothiazolinone [methylisothiazolinone]; [id number] alkyl acrulate crosspol; Gluten meal; Latex, natural rubber; and Penicillins    The patient's allergies have been reviewed    PHYSICAL EXAM  I have reviewed the triage vital signs and nursing notes.  ED Triage Vitals   Temp Heart Rate Resp BP SpO2   06/18/25 1627 06/18/25 1627 06/18/25 1627 06/18/25 1629 06/18/25 1627   97.7 °F (36.5 °C) 80 16 (!) 214/112 97 %      Temp src Heart Rate Source Patient Position BP Location FiO2 (%)   06/18/25 1627 06/18/25 1627 06/18/25 1629 -- --   Oral Monitor Sitting         General: No acute distress.  HENT: NCAT, PERRL, Nares patent.  Eyes: no scleral icterus.  Neck: trachea midline, no ROM limitations.  CV: regular rhythm, regular rate.  Respiratory: normal effort, CTAB.  Abdomen: soft, nondistended, NTTP, no rebound tenderness, no guarding or rigidity.  Musculoskeletal: no deformity.  Neuro: alert, moves all extremities, follows commands.  Skin: warm, dry.    LAB RESULTS  Recent Results (from the past 24 hours)   POC Pregnancy, Urine    Collection Time: 06/18/25  4:20 PM    Specimen: Urine   Result Value Ref Range    HCG, Urine, QL Negative Negative    Lot Number [id number]     Internal Positive Control Passed Positive, Passed    Internal Negative Control Passed Negative, Passed    Expiration  Date 09/02/2026    ECG 12 Lead Other; HTN    Collection Time: 06/18/25  4:56 PM   Result Value Ref Range    QT Interval 390 ms    QTC Interval 409 ms   Comprehensive Metabolic Panel    Collection Time: 06/18/25  4:57 PM    Specimen: Arm, Right; Blood   Result Value Ref Range    Glucose 102 (H) 65 - 99 mg/dL    BUN 11.0 8.0 - 23.0 mg/dL    Creatinine 0.64 0.57 - 1.00 mg/dL    Sodium 142 136 - 145 mmol/L    Potassium 3.7 3.5 - 5.2 mmol/L    Chloride 101 98 - 107 mmol/L    CO2 28.9 22.0 - 29.0 mmol/L    Calcium 9.8 8.6 - 10.5 mg/dL    Total Protein 6.5 6.0 - 8.5 g/dL    Albumin 4.4 3.5 - 5.2 g/dL    ALT (SGPT) 24 1 - 33 U/L    AST (SGOT) 27 1 - 32 U/L    Alkaline Phosphatase 73 39 - 117 U/L    Total Bilirubin 0.6 0.0 - 1.2 mg/dL    Globulin 2.1 gm/dL    A/G Ratio 2.1 g/dL    BUN/Creatinine Ratio 17.2 7.0 - 25.0    Anion Gap 12.1 5.0 - 15.0 mmol/L    eGFR 94.0 >60.0 mL/min/1.73   High Sensitivity Troponin T    Collection Time: 06/18/25  4:57 PM    Specimen: Arm, Right; Blood   Result Value Ref Range    HS Troponin T 7 <14 ng/L   CBC Auto Differential    Collection Time: 06/18/25  4:57 PM    Specimen: Arm, Right; Blood   Result Value Ref Range    WBC 5.47 3.40 - 10.80 10*3/mm3    RBC 4.08 3.77 - 5.28 10*6/mm3    Hemoglobin 12.7 12.0 - 15.9 g/dL    Hematocrit 37.4 34.0 - 46.6 %    MCV 91.7 79.0 - 97.0 fL    MCH 31.1 26.6 - 33.0 pg    MCHC 34.0 31.5 - 35.7 g/dL    RDW 12.2 (L) 12.3 - 15.4 %    RDW-SD 40.6 37.0 - 54.0 fl    MPV 10.6 6.0 - 12.0 fL    Platelets 192 140 - 450 10*3/mm3    Neutrophil % 62.7 42.7 - 76.0 %    Lymphocyte % 24.5 19.6 - 45.3 %    Monocyte % 10.4 5.0 - 12.0 %    Eosinophil % 1.6 0.3 - 6.2 %    Basophil % 0.4 0.0 - 1.5 %    Immature Grans % 0.4 0.0 - 0.5 %    Neutrophils, Absolute 3.43 1.70 - 7.00 10*3/mm3    Lymphocytes, Absolute 1.34 0.70 - 3.10 10*3/mm3    Monocytes, Absolute 0.57 0.10 - 0.90 10*3/mm3    Eosinophils, Absolute 0.09 0.00 - 0.40 10*3/mm3    Basophils, Absolute 0.02 0.00 - 0.20 10*3/mm3     Immature Grans, Absolute 0.02 0.00 - 0.05 10*3/mm3    nRBC 0.0 0.0 - 0.2 /100 WBC       I ordered the above labs and reviewed the results.    RADIOLOGY  XR Chest 1 View  Result Date: 6/18/2025  CXR ONE VIEW  HISTORY: HTN  COMPARISON: Chest x-ray 2/14/2024  TECHNIQUE: single portable AP       The heart size is within normal limits.  The lungs are normally aerated. There is no pleural effusion or pneumothorax.  Redemonstrated thoracic scoliosis, no acute bony abnormality.  This report was finalized on 6/18/2025 5:10 PM by Dr. Foreign Francois M.D on Workstation: [id number]        I ordered the above noted radiological studies. I reviewed the images and results. I agree with the radiologist interpretation.    PROCEDURES  Procedures    MEDICATIONS GIVEN IN ER  Medications   labetalol (NORMODYNE,TRANDATE) injection 10 mg (10 mg Intravenous Given 6/18/25 1755)   labetalol (NORMODYNE,TRANDATE) injection 10 mg (10 mg Intravenous Given 6/18/25 1840)       PROGRESS, DATA ANALYSIS, CONSULTS, AND MEDICAL DECISION MAKING  A complete history and physical exam have been performed.  All available laboratory and imaging results have been reviewed by myself prior to disposition.    MDM    After the initial H&P, I discussed pertinent information from history and physical exam with patient/family.  Discussed differential diagnosis.  Discussed plan for ED evaluation/workup/treatment.  All questions answered.  Patient/family is agreeable with plan.  ED Course as of 06/19/25 1550   Wed Jun 18, 2025   1720 EKG independently viewed and contemporaneously interpreted by ED physician. Time: 1656.  Rate 66.  Interpretation: Normal sinus rhythm, left axis deviation, left atrial enlargement, left anterior fascicular block, poor R wave progression, LVH, no ST elevation or depression, T wave inversion in V1 and V2. [JG]   1720 When compared with prior EKG on 2/14/2024, no acute changes are present. [JG]   1731 Patient presents with  hypertension, no signs or symptoms of hypertensive emergency.  Obtaining ED workup to evaluate for signs of endorgan damage, checking lab work, EKG, chest x-ray, treating blood pressure. [JG]   1737 I reviewed chest x-ray in PACS, no pulmonary infiltrates per my read. [JG]   1852 Blood pressure improved with treatment.  ED workup unremarkable and reassuring.  Patient well-appearing appears appropriate for discharge with outpatient follow-up.  Discussed need for close follow-up with primary care for further blood pressure management.  Extensive discussion return precautions, patient discharged. [JG]      ED Course User Index  [JG] Rafa Otoole MD       AS OF 15:50 EDT VITALS:    BP - 179/91  HR - 63  TEMP - 97.7 °F (36.5 °C) (Oral)  O2 SATS - 99%    DIAGNOSIS  Final diagnoses:   Hypertension, unspecified type         DISPOSITION  DISCHARGE    Patient discharged in stable condition.    Reviewed implications of results, diagnosis, meds, responsibility to follow up, warning signs and symptoms of possible worsening, potential complications and reasons to return to ER.    Patient/Family voiced understanding of above instructions.    Discussed plan for discharge, as there is no emergent indication for admission. Patient referred to primary care provider for BP management due to today's BP. Pt/family is agreeable and understands need for follow up and repeat testing.  Pt is aware that discharge does not mean that nothing is wrong but it indicates no emergency is present that requires admission and they must continue care with follow-up as given below or physician of their choice.     FOLLOW-UP  Klaudia Britt MD  [address] [id number] [phone]    Schedule an appointment as soon as possible for a visit in 2 days  even if well, for further evaluation/management of your blood pressure         Medication List      No changes were made to your prescriptions during this visit.             Rafa Otoole MD  06/19/25 0884

## 2025-06-18 NOTE — ED NOTES
"Pt states that her BP is elevated. Reports \"I just don't feel right\". Reports a hx of HTN and is on medication for it. Has not missed any doses.  "

## 2025-06-19 ENCOUNTER — OFFICE VISIT (OUTPATIENT)
Dept: FAMILY MEDICINE CLINIC | Facility: CLINIC | Age: 70
End: 2025-06-19
Payer: COMMERCIAL

## 2025-06-19 VITALS
BODY MASS INDEX: 24.66 KG/M2 | OXYGEN SATURATION: 98 % | HEIGHT: 63 IN | SYSTOLIC BLOOD PRESSURE: 180 MMHG | DIASTOLIC BLOOD PRESSURE: 80 MMHG | HEART RATE: 62 BPM | WEIGHT: 139.2 LBS

## 2025-06-19 DIAGNOSIS — R80.9 PROTEINURIA, UNSPECIFIED TYPE: Primary | ICD-10-CM

## 2025-06-19 DIAGNOSIS — I10 HYPERTENSION, UNSPECIFIED TYPE: ICD-10-CM

## 2025-06-19 LAB
QT INTERVAL: 390 MS
QTC INTERVAL: 409 MS

## 2025-06-19 RX ORDER — TELMISARTAN 20 MG/1
20 TABLET ORAL DAILY
Qty: 90 TABLET | Refills: 0 | Status: SHIPPED | OUTPATIENT
Start: 2025-06-19

## 2025-06-19 NOTE — PROGRESS NOTES
Chief Complaint  Hospital Follow Up Visit    Subjective        Alicia Lan presents to [facility]       The patient is a 70-year-old female who presents to the clinic for follow-up on high blood pressure.    She experienced a significant increase in blood pressure yesterday, prompting an immediate hospital visit. This was not her first encounter with such an episode. She reports no chest pain or headaches today, although she did experience a headache yesterday due to dehydration. Her fluid intake primarily consists of water and cranberry juice, with no consumption of coffee or other caffeinated beverages. She has been advised to monitor her blood pressure closely. She has been maintaining a blood pressure diary since , which she resumed last night. She is currently on carvedilol, taking 3 tablets (9.375mg total) twice daily, a regimen that was increased a few months ago. She has previously tried olmesartan for a month without success and has also been on amlodipine and a ?clonidine? patch in the past.    She underwent an endometrial  biopsy procedure yesterday without anesthesia. She has not yet scheduled her breast MRI. She is currently on hormone replacement therapy with CombiPatch and carrillo, managed by Dr. Britt. She is also taking baby aspirin.    She had a fall incident two days ago while exiting the bathtub, resulting in some bruising.    She has discussed her cholesterol levels with Dr. Britt and Dr. Armendariz but has not initiated any treatment.    FAMILY HISTORY  Her grandmother  on an operating table at age 70.  Her mother  of a heart attack at age 74 and also had diabetes.    ALLERGIES  She has allergies to ACRYLEX, GLUTEN, LATEX, PENICILLINS, and METHYLISOTHIAZOLINONE.    MEDICATIONS  Current: CombiPatch, baby aspirin, carvedilol  Past: olmesartan, amlodipine, clonidine patch    History of Present Illness    Objective   Vital Signs:  /80 Comment:  "left arm  Pulse 62   Ht 160 cm (62.99\")   Wt 63.1 kg (139 lb 3.2 oz)   SpO2 98%   BMI 24.66 kg/m²   Estimated body mass index is 24.66 kg/m² as calculated from the following:    Height as of this encounter: 160 cm (62.99\").    Weight as of this encounter: 63.1 kg (139 lb 3.2 oz).    BMI is within normal parameters. No other follow-up for BMI required.      Physical Exam  Constitutional:       Appearance: Normal appearance.   HENT:      Head: Normocephalic and atraumatic.      Nose: Nose normal.      Mouth/Throat:      Mouth: Mucous membranes are moist.   Eyes:      General: Lids are normal.      Conjunctiva/sclera: Conjunctivae normal.   Cardiovascular:      Rate and Rhythm: Normal rate and regular rhythm.   Pulmonary:      Effort: Pulmonary effort is normal.      Breath sounds: Normal breath sounds.   Musculoskeletal:      Cervical back: Normal range of motion.      Right lower leg: No edema.      Left lower leg: No edema.   Skin:     General: Skin is warm and dry.   Neurological:      General: No focal deficit present.      Mental Status: She is alert and oriented to person, place, and time.      Cranial Nerves: No cranial nerve deficit.      Sensory: No sensory deficit.      Gait: Gait is intact.      Comments: Romberg negative   Psychiatric:         Mood and Affect: Mood normal.         Behavior: Behavior normal.         Thought Content: Thought content normal.        Result Review :               Results  Laboratory Studies  Urine test showed a little bit of protein and a little bit of acidity.    Imaging  EKG showed no significant changes compared to the one from February 2024.       Assessment and Plan        1. Hypertension.  Her EKG results are satisfactory, showing no significant alterations since February 2024. Laboratory tests indicate normal kidney function, electrolyte balance, and liver health. However, her urine sample revealed minor proteinuria. Her blood pressure remains elevated today, with a " reading of 180/80, and her heart rate low end of normal at 62-63 beats per minute. Hold off on increasing coreg given borderline bradycardia. Given her history of hypotension and labile BP, it is prudent to initiate a low dose of Micardis (telmisartan) 20 mg daily, preferably at night; hopefully will not overcorrect. This medication does not interact with her current regimen and is expected to reduce her systolic and diastolic blood pressure by 5-10 points. It will take at least a week for full effect.  A repeat urinalysis will be conducted today, and her electrolytes and kidney function will be reassessed in about 1-2 weeks. She is advised to maintain adequate hydration. If there is no improvement in her blood pressure after a month on Micardis, a referral to a cardiologist will be considered.    2. Hormone replacement therapy.  She is currently using CombiPatch for hormone replacement therapy. The risks associated with long-term estrogen use, including increased risk for blood clots, were discussed.     3. Fall risk.  She reported a recent fall while getting out of the tub, resulting in bruises. No LOC or head injury. Fall occurred with water in tub, presumed mechanical.    4. Hypercholesterolemia.  Given her family history of heart disease, high cholesterol levels, hypertension, and estrogen use, she is at an increased risk for thromboembolic events. It is recommended to reconsider the priority of treating her high cholesterol as it is one of the biggest risk factors for heart attack and stroke.    Patient iterates disbelief saying elevated cholesterol as heart disease risk factor is contrary to her readings.    Pt counseled.    Diagnoses and all orders for this visit:    1. Proteinuria, unspecified type (Primary)  -     POCT urinalysis dipstick, automated    2. Hypertension, unspecified type  -     Comprehensive metabolic panel; Future    Other orders  -     telmisartan (MICARDIS) 20 MG tablet; Take 1 tablet by  mouth Daily.  Dispense: 90 tablet; Refill: 0           I spent 47 minutes caring for Alicia on this date of service. This time includes time spent by me in the following activities:preparing for the visit, reviewing tests, obtaining and/or reviewing a separately obtained history, performing a medically appropriate examination and/or evaluation , counseling and educating the patient/family/caregiver, ordering medications, tests, or procedures, referring and communicating with other health care professionals , documenting information in the medical record, and independently interpreting results and communicating that information with the patient/family/caregiver  Follow Up   No follow-ups on file.  Patient was given instructions and counseling regarding her condition or for health maintenance advice. Please see specific information pulled into the AVS if appropriate.     Patient or patient representative verbalized consent for the use of Ambient Listening during the visit with  Becky Carrington MD for chart documentation. 6/19/2025  15:37 EDT

## 2025-06-23 ENCOUNTER — TELEPHONE (OUTPATIENT)
Dept: SURGERY | Facility: CLINIC | Age: 70
End: 2025-06-23
Payer: COMMERCIAL

## 2025-06-23 ENCOUNTER — TELEPHONE (OUTPATIENT)
Dept: OBSTETRICS AND GYNECOLOGY | Facility: CLINIC | Age: 70
End: 2025-06-23

## 2025-06-24 ENCOUNTER — TELEPHONE (OUTPATIENT)
Dept: SURGERY | Facility: CLINIC | Age: 70
End: 2025-06-24
Payer: COMMERCIAL

## 2025-06-24 LAB
DX ICD CODE: NORMAL
DX ICD CODE: NORMAL
PATH REPORT.FINAL DX SPEC: NORMAL
PATH REPORT.GROSS SPEC: NORMAL
PATH REPORT.SITE OF ORIGIN SPEC: NORMAL
PATHOLOGIST NAME: NORMAL
PAYMENT PROCEDURE: NORMAL

## 2025-06-24 NOTE — TELEPHONE ENCOUNTER
Pt c-scope 6/25 seen in [facility] er for high B/P advised once it under control & maintained with medication she would giv3e us a call to re-schedule

## 2025-06-25 ENCOUNTER — TELEPHONE (OUTPATIENT)
Dept: FAMILY MEDICINE CLINIC | Facility: CLINIC | Age: 70
End: 2025-06-25

## 2025-06-25 NOTE — TELEPHONE ENCOUNTER
I think the Telmisartan is a good option to try.  Is it okay if I send that or does she want the amlodipine?  I don't want to send something she's hesitant to take.

## 2025-06-25 NOTE — TELEPHONE ENCOUNTER
Caller: Alicia Lan    Relationship: Self    Best call back number: [phone]     What medication are you requesting: AMLODIPINE 5 MG TABLET    What are your current symptoms: BLOOD PRESSURE    If a prescription is needed, what is your preferred pharmacy and phone number: [facility] [address] [place] [id number] [facility] [phone] [facility] [phone]      Additional notes: PATIENT WAS RECENTLY DEALING WITH BLOOD PRESSURE THAT WAS SLIGHTLY ELEVATED AND IS WANTING TO TRY MEDICATION TO SEE IF IT HELPS KEEP BP IN NORMAL

## 2025-06-25 NOTE — TELEPHONE ENCOUNTER
Attempted to contact the patient but no answer. Left voicemail that her biopsy was benign but had a polyp that we can discuss more about tomorrow when I attempt to reach her again.     Jackie Shetty MD

## 2025-06-25 NOTE — TELEPHONE ENCOUNTER
Dr. Carrington gave her telmisartan last week, has she been taking that?  I don't mind sending in the amlodipine but I'd like to follow up on what's been done already.

## 2025-06-26 RX ORDER — AMLODIPINE BESYLATE 5 MG/1
5 TABLET ORAL DAILY
Qty: 90 TABLET | Refills: 1 | Status: SHIPPED | OUTPATIENT
Start: 2025-06-26

## 2025-06-27 PROBLEM — N84.0 ENDOMETRIAL POLYP: Status: ACTIVE | Noted: 2025-06-27

## 2025-06-27 PROBLEM — N95.0 PMB (POSTMENOPAUSAL BLEEDING): Status: ACTIVE | Noted: 2025-06-27

## 2025-07-10 ENCOUNTER — TELEPHONE (OUTPATIENT)
Dept: SURGERY | Facility: CLINIC | Age: 70
End: 2025-07-10
Payer: COMMERCIAL

## 2025-07-11 ENCOUNTER — TELEPHONE (OUTPATIENT)
Dept: OBSTETRICS AND GYNECOLOGY | Facility: CLINIC | Age: 70
End: 2025-07-11
Payer: COMMERCIAL

## 2025-07-15 ENCOUNTER — TELEPHONE (OUTPATIENT)
Dept: OBSTETRICS AND GYNECOLOGY | Facility: CLINIC | Age: 70
End: 2025-07-15